# Patient Record
Sex: MALE | Race: WHITE | Employment: FULL TIME | URBAN - METROPOLITAN AREA
[De-identification: names, ages, dates, MRNs, and addresses within clinical notes are randomized per-mention and may not be internally consistent; named-entity substitution may affect disease eponyms.]

---

## 2021-12-22 ENCOUNTER — OFFICE VISIT (OUTPATIENT)
Dept: ENDOCRINOLOGY | Age: 51
End: 2021-12-22
Payer: COMMERCIAL

## 2021-12-22 VITALS
WEIGHT: 307 LBS | BODY MASS INDEX: 35.52 KG/M2 | HEART RATE: 86 BPM | SYSTOLIC BLOOD PRESSURE: 116 MMHG | HEIGHT: 78 IN | DIASTOLIC BLOOD PRESSURE: 82 MMHG

## 2021-12-22 DIAGNOSIS — E11.9 TYPE 2 DIABETES MELLITUS WITHOUT COMPLICATION, WITHOUT LONG-TERM CURRENT USE OF INSULIN (HCC): Primary | ICD-10-CM

## 2021-12-22 DIAGNOSIS — E55.9 VITAMIN D DEFICIENCY: ICD-10-CM

## 2021-12-22 LAB — HBA1C MFR BLD: 8.3 %

## 2021-12-22 PROCEDURE — 3052F HG A1C>EQUAL 8.0%<EQUAL 9.0%: CPT | Performed by: INTERNAL MEDICINE

## 2021-12-22 PROCEDURE — 83036 HEMOGLOBIN GLYCOSYLATED A1C: CPT | Performed by: INTERNAL MEDICINE

## 2021-12-22 PROCEDURE — 99204 OFFICE O/P NEW MOD 45 MIN: CPT | Performed by: INTERNAL MEDICINE

## 2021-12-22 RX ORDER — VALSARTAN AND HYDROCHLOROTHIAZIDE 320; 25 MG/1; MG/1
TABLET, FILM COATED ORAL
COMMUNITY

## 2021-12-22 RX ORDER — GLIMEPIRIDE 4 MG/1
TABLET ORAL
COMMUNITY
End: 2022-05-19 | Stop reason: SDUPTHER

## 2021-12-22 RX ORDER — DULAGLUTIDE 0.75 MG/.5ML
INJECTION, SOLUTION SUBCUTANEOUS
COMMUNITY
Start: 2021-11-28 | End: 2021-12-22

## 2021-12-22 RX ORDER — DULAGLUTIDE 1.5 MG/.5ML
INJECTION, SOLUTION SUBCUTANEOUS
Qty: 4 PEN | Refills: 5 | Status: SHIPPED
Start: 2021-12-22 | End: 2022-01-24 | Stop reason: CLARIF

## 2021-12-22 RX ORDER — TAMSULOSIN HYDROCHLORIDE 0.4 MG/1
CAPSULE ORAL
COMMUNITY

## 2021-12-22 RX ORDER — OMEPRAZOLE 20 MG/1
CAPSULE, DELAYED RELEASE ORAL
COMMUNITY
Start: 2021-12-16

## 2021-12-22 NOTE — PATIENT INSTRUCTIONS
Recommendations for today's visit  · continue Glimepiride 4 mg twice a day   · Change Trulicity to 1.5 mg weekly   · Check Blood sugar 2 times/day before meals and at bedtime and send us sugar log in a week    These are your blood sugar, blood pressure, cholesterol and A1c goals:  · Blood sugar fastin mg/dl to 130 mg/dl  · Blood sugar before meals: <150 mg/dl  · Peak blood sugar lower than 180 mg/dl  · A1c: between 6.5 - 7.5%    I you have any questions please call Dr. Ajith Mitchell office     Hardik Walker MD  Endocrinologist, [facility])   [address] [place] [address]   Phone: [phone]  Fax: [phone]  Email: [email or web address]

## 2021-12-22 NOTE — PROGRESS NOTES
[address] [place] [address] Department of Endocrinology Diabetes and Metabolism   [address] [facility] [address]   Phone: [phone]  Fax: [phone]    Date of Service: 12/22/2021  Primary Care Physician: Shoaib Jo MD  Provider: Roscoe Chavez MD    Reason for the visit:  DM type 2     History of Present Illness: The history is provided by the patient. No  was used. Accuracy of the patient data is excellent. Felicia Cabot is a very pleasant 46 y.o. male seen today for diabetes management     Felicia Cabot was diagnosed with diabetes in 10/2019 and currently on Trulicity 5.35 mg/wk, Glimepirde 4 mg BID   The patient has been checking blood sugar daily and readings above goal   Most recent A1c results summarized below  Lab Results   Component Value Date    LABA1C 8.3 12/22/2021     Patient has had no hypoglycemic episodes  The patient hasn't been mindful of what has been eating and wasn't following diabetes diet    I reviewed current medications and the patient has no issues with diabetes medications  Felicia Cabot is up to date with eye exam and denied any history of diabetic retinopathy   The patient performs  own feet care  Microvascular complications:  No Retinopathy, Nephropathy or Neuropathy   Macrovascular complications: no CAD   The patient receives Flushot every year      PAST MEDICAL HISTORY   No past medical history on file. PAST SURGICAL HISTORY   No past surgical history on file. SOCIAL HISTORY   Tobacco:   reports that he has never smoked. He has never used smokeless tobacco.  Alcohol:   reports no history of alcohol use. Drugs:   reports no history of drug use. FAMILY HISTORY   No family history on file.     ALLERGIES AND DRUG REACTIONS   No Known Allergies    CURRENT MEDICATIONS   Current Outpatient Medications   Medication Sig Dispense Refill    Dulaglutide (TRULICITY) 1.5 RW/3.1MF SOPN Inject 1.5 mg Subcutaneous weekly 4 pen 5    glimepiride (AMARYL) 4 MG tablet glimepiride 4 mg tablet      influenza quadrivalent-split vaccine (FLULAVAL QUADRIVALENT) SUSP injection Flulaval Quad [id number] 60 mcg (15 mcg x 4)/0.5 mL intramuscular susp.  omeprazole (PRILOSEC) 20 MG delayed release capsule TAKE ONE CAPSULE BY MOUTH EVERY DAY      tamsulosin (FLOMAX) 0.4 MG capsule tamsulosin 0.4 mg capsule      valsartan-hydroCHLOROthiazide (DIOVAN-HCT) 320-25 MG per tablet valsartan 320 mg-hydrochlorothiazide 25 mg tablet       No current facility-administered medications for this visit. Review of Systems  Constitutional: No fever, no chills, no diaphoresis, no generalized weakness. HEENT: No blurred vision, No sore throat, no ear pain, no hair loss  Neck: denied any neck swelling, difficulty swallowing,   Cardio-pulmonary: No CP, SOB or palpitation, No orthopnea or PND. No cough or wheezing. GI: No N/V/D, no constipation, No abdominal pain, no melena or hematochezia   : Denied any dysuria, hematuria, flank pain, discharge, or incontinence. Skin: denied any rash, ulcer, Hirsute, or hyperpigmentation. MSK: denied any joint deformity, joint pain/swelling, muscle pain, or back pain. Neuro: no numbness, no tingling, no weakness, _    OBJECTIVE    /82   Pulse 86   Ht 6' 8\" (2.032 m)   Wt (!) 307 lb (139.3 kg)   BMI 33.73 kg/m²   BP Readings from Last 4 Encounters:   12/22/21 116/82     Wt Readings from Last 6 Encounters:   12/22/21 (!) 307 lb (139.3 kg)       Physical examination:  General: awake alert, oriented x3, no abnormal position or movements. HEENT: normocephalic non-traumatic, no exophthalmos   Neck: supple, no LN enlargement, no thyromegaly, no thyroid tenderness, no JVD. Pulm: Clear equal air entry no added sounds, no wheezing or rhonchi    CVS: S1 + S2, no murmur, no heave. Dorsalis pedis pulse palpable   Abd: soft lax, no tenderness, no organomegaly, audible bowel sounds. Skin: warm, no lesions, no rash.  No callus, no Ulcers, No acanthosis nigricans  Musculoskeletal: No back tenderness, no kyphosis/scoliosis    Neuro: CN intact, Monofilament sensation decreased bilateral , muscle power normal  Psych: normal mood, and affect    Review of Laboratory Data:  I personally reviewed the following lab:  No results found for: WBC, RBC, HGB, HCT, MCV, MCH, MCHC, RDW, PLT, MPV, GRANULOCYTES, BANDS   No results found for: NA, K, CO2, BUN, CREATININE, CALCIUM, LABGLOM, GFRAA   No results found for: TSH, T4FREE, F6NVMWK, FT3, G3AWYPN, TSI, TPOABS, THGAB  Lab Results   Component Value Date    LABA1C 8.3 12/22/2021     Lab Results   Component Value Date    LABA1C 8.3 12/22/2021     No results found for: TRIG, HDL, LDLCALC, CHOL  No results found for: VITD25    ASSESSMENT & RECOMMENDATIONS   Savanna Puga, a 46 y.o.-old male seen in for the following issues     Diabetes Mellitus Type     · Patient's diabetes is uncontrol   · Will change DM regimen to Trulicity 1.5 mg/wk, Glimepirde 4 mg BID   · The patient was advised to check blood sugars 4 times a day before meals and at bedtime and send BS readings to our office in a week. · Discussed with patient A1c and blood sugar goals   · Patient will need routine diabetes maintenance and prevention  · The patient was referred to diabetic educator for further teaching   · Diabetes labs before next visit     Dietary noncompliance   Discussed with patient the importance of eating consistent carbohydrate meals, avoiding high glycemic index food. Also, discussed with patient the risk and negative consequences of dietary noncompliance on blood glucose control, blood pressure and weight    Obesity   Discussed lifestyle changes including diet and exercise with patient in depth.  Also discussed with patient cardiovascular risk associated with obesity    vitD deficiency   · Continue vitD supplement     I personally reviewed external notes from PCP and other patient's care team providers, and personally interpreted labs associated with the above diagnosis. I also ordered labs to further assess and manage the above addressed medical conditions. Return in about 4 months (around 4/22/2022) for DM type 2. The above issues were reviewed with the patient who understood and agreed with the plan. Thank you for allowing us to participate in the care of this patient. Please do not hesitate to contact us with any additional questions. Diagnosis Orders   1. Type 2 diabetes mellitus without complication, without long-term current use of insulin ([facility])  POCT glycosylated hemoglobin (Hb A1C)    Dulaglutide (TRULICITY) 1.5 OZ/7.0JT SOPN    Hemoglobin A1C    Lipid Panel    Comprehensive Metabolic Panel    Microalbumin / Creatinine Urine Ratio   2. Vitamin D deficiency  Vitamin D 25 Hydroxy    Comprehensive Metabolic Panel     Billie Arizmendi MD  Endocrinologist, [facility] Diabetes Care and Endocrinology   [address] [place] [id number]   Phone: [phone]  Fax: [phone]  --------------------------------------------  An electronic signature was used to authenticate this note.  Fredi Vidal MD on 12/22/2021 at 1:21 PM

## 2022-01-24 RX ORDER — DULAGLUTIDE 3 MG/.5ML
3 INJECTION, SOLUTION SUBCUTANEOUS WEEKLY
Qty: 4 PEN | Refills: 3 | Status: SHIPPED
Start: 2022-01-24 | End: 2022-05-19

## 2022-02-11 ENCOUNTER — TELEPHONE (OUTPATIENT)
Dept: ENDOCRINOLOGY | Age: 52
End: 2022-02-11

## 2022-02-11 DIAGNOSIS — E11.9 TYPE 2 DIABETES MELLITUS WITHOUT COMPLICATION, WITHOUT LONG-TERM CURRENT USE OF INSULIN (HCC): Primary | ICD-10-CM

## 2022-02-15 ENCOUNTER — HOSPITAL ENCOUNTER (OUTPATIENT)
Dept: DIABETES SERVICES | Age: 52
Setting detail: THERAPIES SERIES
Discharge: HOME | End: 2022-02-15
Payer: COMMERCIAL

## 2022-02-15 PROCEDURE — G0109 DIAB MANAGE TRN IND/GROUP: HCPCS

## 2022-02-15 SDOH — ECONOMIC STABILITY: FOOD INSECURITY: ADDITIONAL INFORMATION: NO

## 2022-02-15 ASSESSMENT — PROBLEM AREAS IN DIABETES QUESTIONNAIRE (PAID)
WORRYING ABOUT THE FUTURE AND THE POSSIBILITY OF SERIOUS COMPLICATIONS: 2
FEELING SCARED WHEN YOU THINK ABOUT LIVING WITH DIABETES: 2
FEELING THAT DIABETES IS TAKING UP TOO MUCH OF YOUR MENTAL AND PHYSICAL ENERGY EVERY DAY: 0
PAID-5 TOTAL SCORE: 4
COPING WITH COMPLICATIONS OF DIABETES: 0
FEELING DEPRESSED WHEN YOU THINK ABOUT LIVING WITH DIABETES: 0

## 2022-02-15 NOTE — LETTER
[facility])- Diabetes Education Department Initial Diabetes Education Letter    2022       Re:     Rosy Cox         :  1970  Dear Dr. Klaudia Mc:                    Thank you for referring your patient, Rosy Cox, for diabetes education. Rosy Cox has completed their comprehensive education plan today which included the topics below:    Nursing/Medical [x]      Nutrition [x]  [] Diabetes disease process & treatment   [] Diabetes medication use and safety   [] Self-monitoring blood glucose/interpreting results  [] Prevention, detection and treatment of acute complications  [] Prevention, detection and treatment of chronic complications  [] Developing strategies to address psychosocial issues    [] Nutritional management: basic principles   [] Carbohydrate counting, plate method, label reading  [] Benefits of/ways to incorporate physical activity  [] Problem solving and preparing for crisis situations  [] Diabetes supply management  [] Goal setting and behavior modification         PAID -5 (Problem Areas in Diabetes) Survey Results  4  A total score of 8 or greater indicates possible diabetes related emotional distress which warrants further assessment.  [] [address] and Crisis Resource information provided. Comments:     PATIENT SELECTED GOAL:   []  I will follow my meal plan and measure my carbohydrate foods. []  I will increase my activity to:  []  I will check my blood glucose as ordered by my doctor. []  I will take my medications at the correct times as ordered by my doctor.   []  Other:      DIABETES SELF-MANAGEMENT SUPPORT PLAN/REFERRALS (patient identified):  [x] Keep my scheduled visits with my doctor   [] Make and keep appointments with specialists (foot, eye, dentist) as recommended  [] Consult my pharmacist with all new medications and/or any medication questions  [] Get tested for sleep apnea  [] Seek help for:   [] Make an appointment with:   [] Attend smoking cessation classes or call help-line (4-599-QUIT-NOW; [phone])  [] Attend a diabetes support group  [] Use diabetes magazines, books, or the American Diabetes Association website (www.diabetes. org) for more information    [x] Start or increase exercising at home or join a program:  [] Other: There will be a follow-up in 3 months to evaluate the attainment of their chosen goal, and self identified support plan and you will be notified of their progress. Thank you for referring this patient to our program.  Please do not hesitate to call if you have any questions at [phone] [facility] or [facility]) or [phone] ([address] [place] [address]).         Sincerely,    Selene Galeas, RN, BSN, [address] [facility] Diabetes Education Department  American Diabetes Association Recognized DSMES Program

## 2022-02-16 ENCOUNTER — HOSPITAL ENCOUNTER (OUTPATIENT)
Dept: DIABETES SERVICES | Age: 52
Setting detail: THERAPIES SERIES
Discharge: HOME | End: 2022-02-16
Payer: COMMERCIAL

## 2022-02-16 PROCEDURE — G0109 DIAB MANAGE TRN IND/GROUP: HCPCS

## 2022-02-16 NOTE — LETTER
[facility])- Diabetes Education Department Initial Diabetes Education Letter    2022       Re:     Reji Euceda         :  1970  Dear Dr. Reynoso Manner:                    Thank you for referring your patient, Reji Euceda, for diabetes education. Reji Euceda has completed their comprehensive education plan today which included the topics below:    Nursing/Medical [x]      Nutrition [x]  [] Diabetes disease process & treatment   [] Diabetes medication use and safety   [] Self-monitoring blood glucose/interpreting results  [] Prevention, detection and treatment of acute complications  [] Prevention, detection and treatment of chronic complications  [] Developing strategies to address psychosocial issues    [] Nutritional management: basic principles   [] Carbohydrate counting, plate method, label reading  [] Benefits of/ways to incorporate physical activity  [] Problem solving and preparing for crisis situations  [] Diabetes supply management  [] Goal setting and behavior modification         PAID -5 (Problem Areas in Diabetes) Survey Results  4  A total score of 8 or greater indicates possible diabetes related emotional distress which warrants further assessment.  [] [address]  and Crisis Resource information provided. Comments:     PATIENT SELECTED GOAL:   [x]  I will follow my meal plan and measure my carbohydrate foods. []  I will increase my activity to:  []  I will check my blood glucose as ordered by my doctor. []  I will take my medications at the correct times as ordered by my doctor.   []  Other:      DIABETES SELF-MANAGEMENT SUPPORT PLAN/REFERRALS (patient identified):  [x] Keep my scheduled visits with my doctor   [] Make and keep appointments with specialists (foot, eye, dentist) as recommended  [] Consult my pharmacist with all new medications and/or any medication questions  [] Get tested for sleep apnea  [] Seek help for:   [] Make an appointment with:   [] Attend smoking cessation classes or call help-line (5-431-QUIT-NOW; [phone])  [] Attend a diabetes support group  [] Use diabetes magazines, books, or the American Diabetes Association website (www.diabetes. org) for more information    [x] Start or increase exercising at home or join a program:  [] Other: There will be a follow-up in 3 months to evaluate the attainment of their chosen goal, and self identified support plan and you will be notified of their progress. Thank you for referring this patient to our program.  Please do not hesitate to call if you have any questions at [phone] [facility] or [facility]) or [phone] ([facility]).         Sincerely,    Fanta Taylor, RN, BSN, Bharath Trivedi RD, LD    [facility]) Diabetes Education Department  American Diabetes Association Recognized DSMES Program

## 2022-02-16 NOTE — PROGRESS NOTES
effects of diabetes medicines on blood glucose levels;  -List diabetes medication taken, action & side effects 1 [x] All     []  []  4 2/15/22 KMS  Glimepiride 4 mg BID  Trulicity 3 mg weekly    Insulin/Injectables/glucagon  -Name appropriate injection sites; proper storage; supplies needed;  1   [x]  4     Demonstrate proper technique  []      Monitoring blood glucose, interpreting and using results:   -Identify the purpose of testing   -Identify recommended & personal blood glucose targets & HgbA1C target levels  -State the Importance of logging blood glucose levels for pattern recognition;   -State benefits of reading/using pt generated health data  -Verbalize safe lancet disposal 1 [x] All     []  []    []  []  []  4 2/15/22 KMS  Monitors daily    -Demonstrate proper testing technique  []      Incorporating physical activity into lifestyle:   -State effect of exercise on blood glucose levels;   -State benefits of regular exercise;   -Define safety considerations/food choices if needed.  -Describe contraindications/maintenance of activity. 1 [x] All     []  []    []  []  4 2/15/22 KMS  Mowing/yard work, golfing    Incorporating nutritional management into lifestyle:   -Describe effect of type, amount & timing of food on blood glucose  -Describe methods for preparing and planning healthy meals  -Correctly read food labels  -Name 3 foods high in Carbohydrate 1 [] All       []    []    []  []      -Plan a carbohydrate-controlled meal based on individualized meal plan  -Demonstrate CHO counting/portion control   []  []      Developing strategies for problem solving to promote health/change behavior. -Identify 7 self-care behaviors; Personal health risk factors; Benefits, challenges & strategies for behavioral change and set an individualized goal selection.  1       []     []Nutrition  []Monitoring  []Exercise  []Medication  []Other     Identified Barriers to learning/adherence to self management plan:    None  [] other    Instruction Method:  Lecture/Discussion, Power Point Presentation  and Handouts    Supporting Education Materials/Equipment Provided: Self-management manual and Nutritional Packet   []Cook Islander materials       [] services     []Other:      Encounter Type Date Attended Start Time End Time Comments No Show Dates   Assessment          Session 1 2/15/22 KMS 8920 2000      Session 2        1:1 DSMES          In person Follow-up         Gestational Diabetes         Individual MNT        Meter Instrx        Insulin Instrx           Additional Comments: [x] Pt attended group sessions. PCP notified via EMR.          Date:   Follow-up goal attainment based on patients initial DSMES goal    Dr Notified by [] EMR []Fax        []Post class Hgb A1C  []Medication compliance   []Plate method/meal plan compliance   []Able to state the number of Carbohydrate servings eaten at B,L,D   []Testing blood glucose as prescribed by PCP   []Exercise Routine   []Other:   []Other:     []Patient lost to follow-up  Dr Notified by []EMR []Fax     Personal Support Plan:      [x] Keep all scheduled doctor appointments   [] Make and keep appointments with specialists (foot, eye, dentist) as recommended   [] Consult my pharmacist about all new medications or to ask any medication questions   [] Get tested for sleep apnea   [] Seek help for:   [] Make an appointment with:   [] Attend smoking cessation classes or call 1-800-QUIT-NOW  [] Attend Diabetes Support Group   [] Use diabetes magazines, books, or credible web-sites like the ADA for more information  [x] Increase exercise at home or join an exercise program:   [] Other:

## 2022-02-17 NOTE — PROGRESS NOTES
Diabetes Self-Management Education Record    Participant Name: Elinor Heard  Referring Provider: Irineo Krabbe, MD  Assessment/Evaluation Ratings:  1=Needs Instruction   4=Demonstrates Understanding/Competency  2=Needs Review   NC=Not Covered    3=Comprehends Key Points  N/A=Not Applicable  Topics/Learning Objectives Pre-session Assess Date:  Instructor initials/date  2/15/22 KMS Instr. Date    Instructor initials/date  2/15/22 KMS  2/17/22 CS Follow-up Post- session Eval Comments   Diabetes disease process & Treatment process:   -Define type of diabetes in simple terms.  - Describe the ABCs of  diabetes management  -Identify own type of diabetes  -Identify lifestyle changes/treatment options  -other:  1 [x] All     []  []  []  []  []  4 2/15/22 KMS  Type 2 DM   A1C 8.3%    Developing strategies for Healthy coping/psychosocial issues:    -Describe feelings about living with diabetes  -Identify coping strategies and sources of stress  -Identify support needed & support network available  -Complete PAID-5 Diabetes questionnaire 1 [x] All     []  []  []    []  4   2/15/22 KMS  PAID-5 Score: 4   Prevention, detection & treatment of Chronic complications:    -Identify the prevention, detection and treatment for complications including immunizations, preventive eye, foot, dental and renal exams as indicated per the participant's duration of diabetes and health status.  -Define the natural course of diabetes and the relationship of blood glucose levels to long term complications of diabetes.   1 [x] All     []            []  4    Prevention, detection & treatment of acute complications:    -State the causes,signs & symptoms of hyper & hypoglycemia, and prevention & treatment strategies.   -Describe sick day guidelines  DKA /indications for ketone testing &  when to call physician  1 [x] All     []      []    4          -Identify severe weather/situation crisis  & diabetes supplies management  []      Using medications safely:   -State effects of diabetes medicines on blood glucose levels;  -List diabetes medication taken, action & side effects 1 [x] All     []  []  4 2/15/22 KMS  Glimepiride 4 mg BID  Trulicity 3 mg weekly    Insulin/Injectables/glucagon  -Name appropriate injection sites; proper storage; supplies needed;  1   [x]  4     Demonstrate proper technique  []      Monitoring blood glucose, interpreting and using results:   -Identify the purpose of testing   -Identify recommended & personal blood glucose targets & HgbA1C target levels  -State the Importance of logging blood glucose levels for pattern recognition;   -State benefits of reading/using pt generated health data  -Verbalize safe lancet disposal 1 [x] All     []  []    []  []  []  4 2/15/22 KMS  Monitors daily    -Demonstrate proper testing technique  []      Incorporating physical activity into lifestyle:   -State effect of exercise on blood glucose levels;   -State benefits of regular exercise;   -Define safety considerations/food choices if needed.  -Describe contraindications/maintenance of activity. 1 [x] All     []  []    []  []  4 2/15/22 KMS  Mowing/yard work, golfing    Incorporating nutritional management into lifestyle:   -Describe effect of type, amount & timing of food on blood glucose  -Describe methods for preparing and planning healthy meals  -Correctly read food labels  -Name 3 foods high in Carbohydrate 1 [x] All       []    []    []  []  4 2/16/22 CS  Pt attended Session 2. Participated in group activities on Diabetes Plate Method and healthy food choices. Demonstrated understanding of carb counting using food lists with carbohydrate serving sizes. Read CHO content of food correctly on nutrition facts using various food labels . Questions answered.  Followed along and took notes.   -Plan a carbohydrate-controlled meal based on individualized meal plan  -Demonstrate CHO counting/portion control   []  []      Developing strategies for problem solving to promote health/change behavior. -Identify 7 self-care behaviors; Personal health risk factors; Benefits, challenges & strategies for behavioral change and set an individualized goal selection. 1       [x]  4 2/16/22 CS  [x]Nutrition  []Monitoring  []Exercise  []Medication  []Other     Identified Barriers to learning/adherence to self management plan:    None  []  other    Instruction Method:  Lecture/Discussion, Power Point Presentation  and Handouts    Supporting Education Materials/Equipment Provided: Self-management manual and Nutritional Packet   []Bengali materials       [] services     []Other:      Encounter Type Date Attended Start Time End Time Comments No Show Dates   Assessment          Session 1 2/15/22 KMS 1730 2000      Session 2 2/16/ 22CS 1730 2000     1:1 DSMES          In person Follow-up         Gestational Diabetes         Individual MNT        Meter Instrx        Insulin Instrx           Additional Comments: [x] Pt attended group sessions. PCP notified via EMR.          Date:   Follow-up goal attainment based on patients initial DSMES goal    Dr Notified by [] EMR []Fax        []Post class Hgb A1C  []Medication compliance   []Plate method/meal plan compliance   []Able to state the number of Carbohydrate servings eaten at B,L,D   []Testing blood glucose as prescribed by PCP   []Exercise Routine   []Other:   []Other:     []Patient lost to follow-up  Dr Notified by []EMR []Fax     Personal Support Plan:      [x] Keep all scheduled doctor appointments   [] Make and keep appointments with specialists (foot, eye, dentist) as recommended   [] Consult my pharmacist about all new medications or to ask any medication questions   [] Get tested for sleep apnea   [] Seek help for:   [] Make an appointment with:   [] Attend smoking cessation classes or call 8-800-QUIT-NOW  [] Attend Diabetes Support Group   [] Use diabetes magazines, books, or credible web-sites like the ADA for more information  [x] Increase exercise at home or join an exercise program:   [] Other:

## 2022-03-10 ENCOUNTER — HOSPITAL ENCOUNTER (EMERGENCY)
Age: 52
Discharge: HOME | End: 2022-03-10
Payer: COMMERCIAL

## 2022-03-10 VITALS
RESPIRATION RATE: 18 BRPM | BODY MASS INDEX: 34.13 KG/M2 | HEART RATE: 92 BPM | OXYGEN SATURATION: 97 % | DIASTOLIC BLOOD PRESSURE: 83 MMHG | SYSTOLIC BLOOD PRESSURE: 121 MMHG | WEIGHT: 295 LBS | TEMPERATURE: 97.5 F | HEIGHT: 78 IN

## 2022-03-10 DIAGNOSIS — N30.01 ACUTE CYSTITIS WITH HEMATURIA: Primary | ICD-10-CM

## 2022-03-10 LAB
BACTERIA: ABNORMAL /HPF
BASOPHILS ABSOLUTE: 0.1 E9/L (ref 0–0.2)
BASOPHILS RELATIVE PERCENT: 0.8 % (ref 0–2)
BILIRUBIN URINE: ABNORMAL
BLOOD, URINE: ABNORMAL
CLARITY: ABNORMAL
CO2: 30 MMOL/L (ref 22–29)
COLOR: YELLOW
EOSINOPHILS ABSOLUTE: 0.33 E9/L (ref 0.05–0.5)
EOSINOPHILS RELATIVE PERCENT: 2.6 % (ref 0–6)
EPITHELIAL CELLS, UA: ABNORMAL /HPF
GFR AFRICAN AMERICAN: >60
GFR NON-AFRICAN AMERICAN: >60 ML/MIN/1.73
GLUCOSE BLD-MCNC: 216 MG/DL (ref 74–99)
GLUCOSE URINE: >=1000 MG/DL
HCT VFR BLD CALC: 43.6 % (ref 37–54)
HEMOGLOBIN: 14.8 G/DL (ref 12.5–16.5)
IMMATURE GRANULOCYTES #: 0.05 E9/L
IMMATURE GRANULOCYTES %: 0.4 % (ref 0–5)
KETONES, URINE: NEGATIVE MG/DL
LEUKOCYTE ESTERASE, URINE: NEGATIVE
LYMPHOCYTES ABSOLUTE: 2.92 E9/L (ref 1.5–4)
LYMPHOCYTES RELATIVE PERCENT: 23.1 % (ref 20–42)
MCH RBC QN AUTO: 29 PG (ref 26–35)
MCHC RBC AUTO-ENTMCNC: 33.9 % (ref 32–34.5)
MCV RBC AUTO: 85.3 FL (ref 80–99.9)
MONOCYTES ABSOLUTE: 0.93 E9/L (ref 0.1–0.95)
MONOCYTES RELATIVE PERCENT: 7.4 % (ref 2–12)
MUCUS: PRESENT /LPF
NEUTROPHILS ABSOLUTE: 8.3 E9/L (ref 1.8–7.3)
NEUTROPHILS RELATIVE PERCENT: 65.7 % (ref 43–80)
NITRITE, URINE: NEGATIVE
PDW BLD-RTO: 12.6 FL (ref 11.5–15)
PERFORMED ON: ABNORMAL
PH UA: 5.5 (ref 5–9)
PLATELET # BLD: 378 E9/L (ref 130–450)
PMV BLD AUTO: 10 FL (ref 7–12)
POC ANION GAP: 9 MMOL/L (ref 7–16)
POC BUN: 18 MG/DL (ref 8–23)
POC CHLORIDE: 102 MMOL/L (ref 100–108)
POC CREATININE: 1 MG/DL (ref 0.7–1.2)
POC POTASSIUM: 3.8 MMOL/L (ref 3.5–5)
POC SODIUM: 141 MMOL/L (ref 132–146)
PROTEIN UA: 30 MG/DL
RBC # BLD: 5.11 E12/L (ref 3.8–5.8)
RBC UA: >20 /HPF (ref 0–2)
SPECIFIC GRAVITY UA: >=1.03 (ref 1–1.03)
UROBILINOGEN, URINE: 0.2 E.U./DL
WBC # BLD: 12.6 E9/L (ref 4.5–11.5)
WBC UA: ABNORMAL /HPF (ref 0–5)

## 2022-03-10 PROCEDURE — 85025 COMPLETE CBC W/AUTO DIFF WBC: CPT

## 2022-03-10 PROCEDURE — 81001 URINALYSIS AUTO W/SCOPE: CPT

## 2022-03-10 PROCEDURE — 36415 COLL VENOUS BLD VENIPUNCTURE: CPT

## 2022-03-10 PROCEDURE — 99211 OFF/OP EST MAY X REQ PHY/QHP: CPT

## 2022-03-10 PROCEDURE — 87088 URINE BACTERIA CULTURE: CPT

## 2022-03-10 PROCEDURE — 80051 ELECTROLYTE PANEL: CPT

## 2022-03-10 PROCEDURE — 82947 ASSAY GLUCOSE BLOOD QUANT: CPT

## 2022-03-10 PROCEDURE — 84520 ASSAY OF UREA NITROGEN: CPT

## 2022-03-10 PROCEDURE — 82565 ASSAY OF CREATININE: CPT

## 2022-03-10 RX ORDER — CEFDINIR 300 MG/1
300 CAPSULE ORAL 2 TIMES DAILY
Qty: 14 CAPSULE | Refills: 0 | Status: SHIPPED | OUTPATIENT
Start: 2022-03-10 | End: 2022-03-17

## 2022-03-10 ASSESSMENT — PAIN DESCRIPTION - LOCATION: LOCATION: FLANK

## 2022-03-10 ASSESSMENT — PAIN DESCRIPTION - PAIN TYPE: TYPE: ACUTE PAIN

## 2022-03-10 ASSESSMENT — PAIN DESCRIPTION - DESCRIPTORS: DESCRIPTORS: SORE

## 2022-03-10 ASSESSMENT — PAIN - FUNCTIONAL ASSESSMENT: PAIN_FUNCTIONAL_ASSESSMENT: 0-10

## 2022-03-10 ASSESSMENT — PAIN DESCRIPTION - ONSET: ONSET: GRADUAL

## 2022-03-10 ASSESSMENT — PAIN DESCRIPTION - ORIENTATION: ORIENTATION: RIGHT

## 2022-03-10 ASSESSMENT — PAIN DESCRIPTION - PROGRESSION: CLINICAL_PROGRESSION: GRADUALLY IMPROVING

## 2022-03-10 ASSESSMENT — PAIN DESCRIPTION - FREQUENCY: FREQUENCY: INTERMITTENT

## 2022-03-10 ASSESSMENT — PAIN SCALES - GENERAL: PAINLEVEL_OUTOF10: 2

## 2022-03-10 NOTE — ED PROVIDER NOTES
[facility]  Department of Emergency Medicine  UC Encounter Note  3/10/22   4:56 PM EST      NAME: Dominga Camarena  :  1970  MRN:  [id number]    Chief Complaint: Flank Pain (right side) and Dysuria (Urinating in small amounts. States urine is dark.)      This is a 66-year-old male that presents to urgent care stating that he has been having some dark urine lately and he has been urinating in small amounts but having some dysuria as well. He does state a history of kidney stones in the past and does state that he does passed kidney stones from time to time. He says about 5 days ago he started passing a kidney stone on the right side and felt higher up right flank pain and that pain has dissipated and has a twinge of discomfort to the right side of his abdomen from time to time. He just wants to make sure he does have an infection. He does take Flomax on a regular basis. He denies any fevers or chills. No chest pain or shortness of breath. On first contact patient he appears to be in no acute distress. Patient denies swelling in his groin area or rash. Review of Systems  Pertinent positives and negatives are stated within HPI, all other systems reviewed and are negative. Physical Exam  Vitals and nursing note reviewed. Constitutional:       Appearance: He is well-developed. HENT:      Head: Normocephalic and atraumatic. Jaw: No trismus. Right Ear: Hearing, tympanic membrane, ear canal and external ear normal.      Left Ear: Hearing, tympanic membrane, ear canal and external ear normal.      Nose: Nose normal.      Right Sinus: No maxillary sinus tenderness or frontal sinus tenderness. Left Sinus: No maxillary sinus tenderness or frontal sinus tenderness. Mouth/Throat:      Pharynx: Uvula midline. No uvula swelling.    Eyes:      General: Lids are normal.      Conjunctiva/sclera: Conjunctivae normal.      Pupils: Pupils are equal, round, and reactive to light. Cardiovascular:      Rate and Rhythm: Normal rate and regular rhythm. Heart sounds: Normal heart sounds. No murmur heard. Pulmonary:      Effort: Pulmonary effort is normal.      Breath sounds: Normal breath sounds. Abdominal:      General: Bowel sounds are normal.      Palpations: Abdomen is soft. Abdomen is not rigid. Tenderness: There is no abdominal tenderness. There is no right CVA tenderness, left CVA tenderness, guarding or rebound. Musculoskeletal:      Cervical back: Normal range of motion and neck supple. Skin:     General: Skin is warm and dry. Findings: No abrasion or rash. Neurological:      General: No focal deficit present. Mental Status: He is alert and oriented to person, place, and time. GCS: GCS eye subscore is 4. GCS verbal subscore is 5. GCS motor subscore is 6. Cranial Nerves: No cranial nerve deficit. Sensory: No sensory deficit. Coordination: Coordination normal.      Gait: Gait normal.         Procedures    MDM  Number of Diagnoses or Management Options  Acute cystitis with hematuria  Diagnosis management comments: No acute distress  Abdomen benign  Labs reviewed  meds discussed  Instructions given.            --------------------------------------------- PAST HISTORY ---------------------------------------------  Past Medical History:  has a past medical history of Acid reflux, Diabetes mellitus ([facility] [address] Hypertension, and Kidney stone. Past Surgical History:  has a past surgical history that includes Lithotripsy. Social History:  reports that he has never smoked. He has never used smokeless tobacco. He reports that he does not drink alcohol and does not use drugs. Family History: family history is not on file. The patients home medications have been reviewed. Allergies: Patient has no known allergies.     -------------------------------------------------- RESULTS -------------------------------------------------  Results for orders placed or performed during the hospital encounter of 03/10/22   Urinalysis   Result Value Ref Range    Color, UA Yellow Straw/Yellow    Clarity, UA CLOUDY (A) Clear    Glucose, Ur >=1000 (A) Negative mg/dL    Bilirubin Urine SMALL (A) Negative    Ketones, Urine Negative Negative mg/dL    Specific Gravity, UA >=1.030 1.005 - 1.030    Blood, Urine LARGE (A) Negative    pH, UA 5.5 5.0 - 9.0    Protein, UA 30 (A) Negative mg/dL    Urobilinogen, Urine 0.2 <2.0 E.U./dL    Nitrite, Urine Negative Negative    Leukocyte Esterase, Urine Negative Negative   CBC with Auto Differential   Result Value Ref Range    WBC 12.6 (H) 4.5 - 11.5 E9/L    RBC 5.11 3.80 - 5.80 E12/L    Hemoglobin 14.8 12.5 - 16.5 g/dL    Hematocrit 43.6 37.0 - 54.0 %    MCV 85.3 80.0 - 99.9 fL    MCH 29.0 26.0 - 35.0 pg    MCHC 33.9 32.0 - 34.5 %    RDW 12.6 11.5 - 15.0 fL    Platelets 538 401 - 622 E9/L    MPV 10.0 7.0 - 12.0 fL    Neutrophils % 65.7 43.0 - 80.0 %    Immature Granulocytes % 0.4 0.0 - 5.0 %    Lymphocytes % 23.1 20.0 - 42.0 %    Monocytes % 7.4 2.0 - 12.0 %    Eosinophils % 2.6 0.0 - 6.0 %    Basophils % 0.8 0.0 - 2.0 %    Neutrophils Absolute 8.30 (H) 1.80 - 7.30 E9/L    Immature Granulocytes # 0.05 E9/L    Lymphocytes Absolute 2.92 1.50 - 4.00 E9/L    Monocytes Absolute 0.93 0.10 - 0.95 E9/L    Eosinophils Absolute 0.33 0.05 - 0.50 E9/L    Basophils Absolute 0.10 0.00 - 0.20 E9/L   Microscopic Urinalysis   Result Value Ref Range    Mucus, UA Present (A) None Seen /LPF    WBC, UA 2-5 0 - 5 /HPF    RBC, UA >20 0 - 2 /HPF    Epithelial Cells, UA RARE /HPF    Bacteria, UA RARE (A) None Seen /HPF   POCT Venous   Result Value Ref Range    POC Sodium 141 132 - 146 mmol/L    POC Potassium 3.8 3.5 - 5.0 mmol/L    POC Chloride 102 100 - 108 mmol/L    CO2 30 (H) 22 - 29 mmol/L    POC Anion Gap 9 7 - 16 mmol/L    POC Glucose 216 (H) 74 - 99 mg/dl    POC BUN 18 8 - 23 mg/dL    POC Creatinine 1.0 0.7 - 1.2 mg/dL    GFR Non-African American >60 >=60 mL/min/1.73    GFR  >60     Performed on SEE BELOW      No orders to display       ------------------------- NURSING NOTES AND VITALS REVIEWED ---------------------------   The nursing notes within the ED encounter and vital signs as below have been reviewed. /83   Pulse 92   Temp 97.5 °F (36.4 °C) (Infrared)   Resp 18   Ht 6' 8\" (2.032 m)   Wt 295 lb (133.8 kg)   SpO2 97%   BMI 32.41 kg/m²   Oxygen Saturation Interpretation: Normal      ------------------------------------------ PROGRESS NOTES ------------------------------------------   I have spoken with the patient and discussed todays results, in addition to providing specific details for the plan of care and counseling regarding the diagnosis and prognosis. Their questions are answered at this time and they are agreeable with the plan.      --------------------------------- ADDITIONAL PROVIDER NOTES ---------------------------------     This patient is stable for discharge. I have shared the specific conditions for return, as well as the importance of follow-up. * NOTE: This report was transcribed using voice recognition software. Every effort was made to ensure accuracy; however, inadvertent computerized transcription errors may be present.    --------------------------------- IMPRESSION AND DISPOSITION ---------------------------------    IMPRESSION  1.  Acute cystitis with hematuria        DISPOSITION  Disposition: Discharge to home  Patient condition is good       Martha Rojas PA-C  03/10/22 [id number]

## 2022-03-13 LAB — URINE CULTURE, ROUTINE: NORMAL

## 2022-05-12 LAB
ALBUMIN SERPL-MCNC: 4.5 G/DL
ALP BLD-CCNC: NORMAL U/L
ALT SERPL-CCNC: NORMAL U/L
ANION GAP SERPL CALCULATED.3IONS-SCNC: NORMAL MMOL/L
AST SERPL-CCNC: NORMAL U/L
AVERAGE GLUCOSE: NORMAL
BILIRUB SERPL-MCNC: NORMAL MG/DL
BUN BLDV-MCNC: NORMAL MG/DL
CALCIUM SERPL-MCNC: 10.3 MG/DL
CHLORIDE BLD-SCNC: 101 MMOL/L
CHOLESTEROL, TOTAL: 199 MG/DL
CHOLESTEROL/HDL RATIO: NORMAL
CO2: NORMAL
CREAT SERPL-MCNC: 1.01 MG/DL
CREATININE, URINE: 13.8
GFR CALCULATED: 77.6
GLUCOSE BLD-MCNC: NORMAL MG/DL
HBA1C MFR BLD: 7.9 %
HDLC SERPL-MCNC: 46 MG/DL (ref 35–70)
LDL CHOLESTEROL CALCULATED: 131 MG/DL (ref 0–160)
MICROALBUMIN/CREAT 24H UR: 168.1 MG/G{CREAT}
MICROALBUMIN/CREAT UR-RTO: 8.2
NONHDLC SERPL-MCNC: NORMAL MG/DL
POTASSIUM SERPL-SCNC: 4.2 MMOL/L
SODIUM BLD-SCNC: 141 MMOL/L
TOTAL PROTEIN: NORMAL
TRIGL SERPL-MCNC: 109 MG/DL
VITAMIN D 25-HYDROXY: 20
VITAMIN D2, 25 HYDROXY: NORMAL
VITAMIN D3,25 HYDROXY: NORMAL
VLDLC SERPL CALC-MCNC: NORMAL MG/DL

## 2022-05-12 NOTE — PROGRESS NOTES
Diabetes Self-Management Education Record    Participant Name: Ariana Otero  Referring Provider: Leah Monahan MD  Assessment/Evaluation Ratings:  1=Needs Instruction   4=Demonstrates Understanding/Competency  2=Needs Review   NC=Not Covered    3=Comprehends Key Points  N/A=Not Applicable  Topics/Learning Objectives Pre-session Assess Date:  Instructor initials/date  2/15/22 KMS Instr. Date    Instructor initials/date  2/15/22 KMS  2/17/22 CS Follow-up Post- session Eval Comments   Diabetes disease process & Treatment process:   -Define type of diabetes in simple terms.  - Describe the ABCs of  diabetes management  -Identify own type of diabetes  -Identify lifestyle changes/treatment options  -other:  1 [x] All     []  []  []  []  []  4 2/15/22 KMS  Type 2 DM   A1C 8.3%    Developing strategies for Healthy coping/psychosocial issues:    -Describe feelings about living with diabetes  -Identify coping strategies and sources of stress  -Identify support needed & support network available  -Complete PAID-5 Diabetes questionnaire 1 [x] All     []  []  []    []  4   2/15/22 KMS  PAID-5 Score: 4   Prevention, detection & treatment of Chronic complications:    -Identify the prevention, detection and treatment for complications including immunizations, preventive eye, foot, dental and renal exams as indicated per the participant's duration of diabetes and health status.  -Define the natural course of diabetes and the relationship of blood glucose levels to long term complications of diabetes.   1 [x] All     []            []  4    Prevention, detection & treatment of acute complications:    -State the causes,signs & symptoms of hyper & hypoglycemia, and prevention & treatment strategies.   -Describe sick day guidelines  DKA /indications for ketone testing &  when to call physician  1 [x] All     []      []    4          -Identify severe weather/situation crisis  & diabetes supplies management  []      Using medications safely:   -State effects of diabetes medicines on blood glucose levels;  -List diabetes medication taken, action & side effects 1 [x] All     []  []  4 2/15/22 KMS  Glimepiride 4 mg BID  Trulicity 3 mg weekly    Insulin/Injectables/glucagon  -Name appropriate injection sites; proper storage; supplies needed;  1   [x]  4     Demonstrate proper technique  []      Monitoring blood glucose, interpreting and using results:   -Identify the purpose of testing   -Identify recommended & personal blood glucose targets & HgbA1C target levels  -State the Importance of logging blood glucose levels for pattern recognition;   -State benefits of reading/using pt generated health data  -Verbalize safe lancet disposal 1 [x] All     []  []    []  []  []  4 2/15/22 KMS  Monitors daily    -Demonstrate proper testing technique  []      Incorporating physical activity into lifestyle:   -State effect of exercise on blood glucose levels;   -State benefits of regular exercise;   -Define safety considerations/food choices if needed.  -Describe contraindications/maintenance of activity. 1 [x] All     []  []    []  []  4 2/15/22 KMS  Mowing/yard work, golfing    Incorporating nutritional management into lifestyle:   -Describe effect of type, amount & timing of food on blood glucose  -Describe methods for preparing and planning healthy meals  -Correctly read food labels  -Name 3 foods high in Carbohydrate 1 [x] All       []    []    []  []  4 2/16/22 CS  Pt attended Session 2. Participated in group activities on Diabetes Plate Method and healthy food choices. Demonstrated understanding of carb counting using food lists with carbohydrate serving sizes. Read CHO content of food correctly on nutrition facts using various food labels . Questions answered.  Followed along and took notes.   -Plan a carbohydrate-controlled meal based on individualized meal plan  -Demonstrate CHO counting/portion control   []  []      Developing strategies for problem solving to promote health/change behavior. -Identify 7 self-care behaviors; Personal health risk factors; Benefits, challenges & strategies for behavioral change and set an individualized goal selection. 1       [x]  4 2/16/22 CS  [x]Nutrition  []Monitoring  []Exercise  []Medication  []Other     Identified Barriers to learning/adherence to self management plan:    None  []  other    Instruction Method:  Lecture/Discussion, Power Point Presentation  and Handouts    Supporting Education Materials/Equipment Provided: Self-management manual and Nutritional Packet   []Greek materials       [] services     []Other:      Encounter Type Date Attended Start Time End Time Comments No Show Dates   Assessment          Session 1 2/15/22 KMS 1730 2000      Session 2 2/16/ 22CS 1730 2000     1:1 DSMES          In person Follow-up         Gestational Diabetes         Individual MNT        Meter Instrx        Insulin Instrx           Additional Comments: [x] Pt attended group sessions. PCP notified via EMR.          Date: 5/12/22 PC  Follow-up goal attainment based on patients initial DSMES goals  met  Dr Notified by [] EMR []Fax        []Post class Hgb A1C  []Medication compliance   [x]Plate method/meal plan compliance   []Able to state the number of Carbohydrate servings eaten at B,L,D   [x]Testing blood glucose as prescribed by PCP   []Exercise Routine   []Other:   []Other:     []Patient lost to follow-up   Notified by []EMR []Fax     Personal Support Plan:      [x] Keep all scheduled doctor appointments   [] Make and keep appointments with specialists (foot, eye, dentist) as recommended   [] Consult my pharmacist about all new medications or to ask any medication questions   [] Get tested for sleep apnea   [] Seek help for:   [] Make an appointment with:   [] Attend smoking cessation classes or call 1800-QUIT-NOW  [] Attend Diabetes Support Group   [] Use diabetes magazines, books, or credible web-sites like the ADA for more information  [x] Increase exercise at home or join an exercise program:   [] Other:

## 2022-05-12 NOTE — LETTER
[facility] Diabetes Education Follow-up letter to Doctor     Name: Juani Chowdary  :   1970    Date:   2022                   Dear Dr. Jake Schroeder: Thank you for referring Juani Chowdary to [facility] Education Services. Juani Chowdary has completed her Diabetes Education Self-Management Sessions. We follow-up with our participants after 3 months to monitor their progress on their selected goal.    PATIENT'S GOAL/EDUCATIONAL OUTCOMES:    Juani Chowdary selected a behavioral goal of: healthy eating   Juani Chowdary outcome 3 months post education was met 75% of the time but wishes to come back to review his meal plan again.     LEARNING OUTCOME: YOUR PATIENT WAS FOUND TO BE COMPETENT IN THE FOLLOWING TOPICS:    [x] Diabetes disease process & treatment   [] Healthy Eating  [x] Being Active  [x] Taking Medication  [x] Monitoring Glucose  [x] Acute and Chronic Complications  [x] Lifestyle and Healthy coping  [x] Diabetes Distress and Support      Please contact us if you need any further assistance with this patient at:     [facility] or [facility] [address] Locations: [place] Location: [address] [place], [place] [address]gayatri Espinosa RN [phone] [facility] Diabetes Education Department  American Diabetes Association Recognized DSMES Program

## 2022-05-16 DIAGNOSIS — E11.9 TYPE 2 DIABETES MELLITUS WITHOUT COMPLICATION, WITHOUT LONG-TERM CURRENT USE OF INSULIN (HCC): ICD-10-CM

## 2022-05-16 DIAGNOSIS — E55.9 VITAMIN D DEFICIENCY: ICD-10-CM

## 2022-05-19 ENCOUNTER — OFFICE VISIT (OUTPATIENT)
Dept: ENDOCRINOLOGY | Age: 52
End: 2022-05-19
Payer: COMMERCIAL

## 2022-05-19 VITALS
HEIGHT: 78 IN | BODY MASS INDEX: 34.48 KG/M2 | WEIGHT: 298 LBS | OXYGEN SATURATION: 96 % | HEART RATE: 105 BPM | DIASTOLIC BLOOD PRESSURE: 88 MMHG | SYSTOLIC BLOOD PRESSURE: 122 MMHG

## 2022-05-19 DIAGNOSIS — E78.5 DYSLIPIDEMIA ASSOCIATED WITH TYPE 2 DIABETES MELLITUS (HCC): ICD-10-CM

## 2022-05-19 DIAGNOSIS — E66.09 CLASS 1 OBESITY DUE TO EXCESS CALORIES WITHOUT SERIOUS COMORBIDITY WITH BODY MASS INDEX (BMI) OF 32.0 TO 32.9 IN ADULT: ICD-10-CM

## 2022-05-19 DIAGNOSIS — E11.9 TYPE 2 DIABETES MELLITUS WITHOUT COMPLICATION, WITHOUT LONG-TERM CURRENT USE OF INSULIN (HCC): Primary | ICD-10-CM

## 2022-05-19 DIAGNOSIS — E11.69 DYSLIPIDEMIA ASSOCIATED WITH TYPE 2 DIABETES MELLITUS (HCC): ICD-10-CM

## 2022-05-19 DIAGNOSIS — Z91.119 DIETARY NONCOMPLIANCE: ICD-10-CM

## 2022-05-19 DIAGNOSIS — E55.9 VITAMIN D DEFICIENCY: ICD-10-CM

## 2022-05-19 PROCEDURE — 3051F HG A1C>EQUAL 7.0%<8.0%: CPT | Performed by: CLINICAL NURSE SPECIALIST

## 2022-05-19 PROCEDURE — 99214 OFFICE O/P EST MOD 30 MIN: CPT | Performed by: CLINICAL NURSE SPECIALIST

## 2022-05-19 RX ORDER — GLIMEPIRIDE 4 MG/1
TABLET ORAL
Qty: 180 TABLET | Refills: 1 | Status: SHIPPED | OUTPATIENT
Start: 2022-05-19

## 2022-05-19 RX ORDER — ATORVASTATIN CALCIUM 20 MG/1
20 TABLET, FILM COATED ORAL DAILY
Qty: 90 TABLET | Refills: 1 | Status: SHIPPED | OUTPATIENT
Start: 2022-05-19

## 2022-05-25 ENCOUNTER — PATIENT MESSAGE (OUTPATIENT)
Dept: ENDOCRINOLOGY | Age: 52
End: 2022-05-25

## 2022-05-25 ENCOUNTER — TELEPHONE (OUTPATIENT)
Dept: ENDOCRINOLOGY | Age: 52
End: 2022-05-25

## 2022-05-31 ENCOUNTER — HOSPITAL ENCOUNTER (OUTPATIENT)
Dept: DIABETES SERVICES | Age: 52
Setting detail: THERAPIES SERIES
Discharge: HOME | End: 2022-05-31

## 2022-06-07 ENCOUNTER — TELEPHONE (OUTPATIENT)
Dept: ENDOCRINOLOGY | Age: 52
End: 2022-06-07

## 2022-06-09 ENCOUNTER — HOSPITAL ENCOUNTER (OUTPATIENT)
Dept: DIABETES SERVICES | Age: 52
Setting detail: THERAPIES SERIES
Discharge: HOME | End: 2022-06-09
Payer: COMMERCIAL

## 2022-06-09 PROCEDURE — G0108 DIAB MANAGE TRN  PER INDIV: HCPCS

## 2022-06-09 NOTE — PROGRESS NOTES
Diabetes Self-Management Education Record    Participant Name: Juani Chowdary  Referring Provider: Nader Glover MD  Assessment/Evaluation Ratings:  1=Needs Instruction   4=Demonstrates Understanding/Competency  2=Needs Review   NC=Not Covered    3=Comprehends Key Points  N/A=Not Applicable  Topics/Learning Objectives Pre-session Assess Date:  Instructor initials/date  2/15/22 KMS Instr. Date    Instructor initials/date  2/15/22 KMS  2/17/22 CS Follow-up Post- session Eval Comments   Diabetes disease process & Treatment process:   -Define type of diabetes in simple terms.  - Describe the ABCs of  diabetes management  -Identify own type of diabetes  -Identify lifestyle changes/treatment options  -other:  1 [x] All     []  []  []  []  []  4 2/15/22 KMS  Type 2 DM   A1C 8.3%    Developing strategies for Healthy coping/psychosocial issues:    -Describe feelings about living with diabetes  -Identify coping strategies and sources of stress  -Identify support needed & support network available  -Complete PAID-5 Diabetes questionnaire 1 [x] All     []  []  []    []  4   2/15/22 KMS  PAID-5 Score: 4   Prevention, detection & treatment of Chronic complications:    -Identify the prevention, detection and treatment for complications including immunizations, preventive eye, foot, dental and renal exams as indicated per the participant's duration of diabetes and health status.  -Define the natural course of diabetes and the relationship of blood glucose levels to long term complications of diabetes.   1 [x] All     []            []  4    Prevention, detection & treatment of acute complications:    -State the causes,signs & symptoms of hyper & hypoglycemia, and prevention & treatment strategies.   -Describe sick day guidelines  DKA /indications for ketone testing &  when to call physician  1 [x] All     []      []    4          -Identify severe weather/situation crisis  & diabetes supplies management  []      Using medications safely:   -State effects of diabetes medicines on blood glucose levels;  -List diabetes medication taken, action & side effects 1 [x] All     []  []  4 2/15/22 KMS  Glimepiride 4 mg BID  Trulicity 3 mg weekly    Insulin/Injectables/glucagon  -Name appropriate injection sites; proper storage; supplies needed;  1   [x]  4     Demonstrate proper technique  []      Monitoring blood glucose, interpreting and using results:   -Identify the purpose of testing   -Identify recommended & personal blood glucose targets & HgbA1C target levels  -State the Importance of logging blood glucose levels for pattern recognition;   -State benefits of reading/using pt generated health data  -Verbalize safe lancet disposal 1 [x] All     []  []    []  []  []  4 2/15/22 KMS  Monitors daily    -Demonstrate proper testing technique  []      Incorporating physical activity into lifestyle:   -State effect of exercise on blood glucose levels;   -State benefits of regular exercise;   -Define safety considerations/food choices if needed.  -Describe contraindications/maintenance of activity. 1 [x] All     []  []    []  []  4 2/15/22 KMS  Mowing/yard work, golfing    Incorporating nutritional management into lifestyle:   -Describe effect of type, amount & timing of food on blood glucose  -Describe methods for preparing and planning healthy meals  -Correctly read food labels  -Name 3 foods high in Carbohydrate 1 [x] All       []    []    []  []  4 2/16/22 CS  Pt attended Session 2. Participated in group activities on Diabetes Plate Method and healthy food choices. Demonstrated understanding of carb counting using food lists with carbohydrate serving sizes. Read CHO content of food correctly on nutrition facts using various food labels . Questions answered. Followed along and took notes. 6/9/22 SE  DSMES 3 Meal planning. Pt instructed on 2200 Calories, 3 meals 3  Snack(s). Pt was able to plan meal using food models and serving size sheet.  Reviewed fast food and convenience food modifications. Attended with wife. Encouraged to decrease intake of diet soda from the current 2-3 liters a day. Encouraged planning ahead/packing meals and snacks. Purchased extra food lists book.    -Plan a carbohydrate-controlled meal based on individualized meal plan  -Demonstrate CHO counting/portion control   []  []      Developing strategies for problem solving to promote health/change behavior. -Identify 7 self-care behaviors; Personal health risk factors; Benefits, challenges & strategies for behavioral change and set an individualized goal selection. 1       [x]  4 2/16/22 CS  [x]Nutrition  []Monitoring  []Exercise  []Medication  []Other     Identified Barriers to learning/adherence to self management plan:    None  []  other    Instruction Method:  Lecture/Discussion, Power Point Presentation  and Handouts    Supporting Education Materials/Equipment Provided: Self-management manual and Nutritional Packet   []Mohawk materials       [] services     []Other:      Encounter Type Date Attended Start Time End Time Comments No Show Dates   Assessment          Session 1 2/15/22 KMS 1730 2000      Session 2 2/16/ 22CS 1730 2000     1:1 DSMES  6/9/22 SE [id number]      In person Follow-up         Gestational Diabetes         Individual MNT        Meter Instrx        Insulin Instrx           Additional Comments: [x] Pt attended group sessions. PCP notified via EMR. Attended with wife. 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     Date:   Follow-up goal attainment based on patients initial DSMES goal     Notified by [] EMR []Fax        []Post class Hgb A1C  []Medication compliance   []Plate method/meal plan compliance   []Able to state the number of Carbohydrate servings eaten at B,L,D   []Testing blood glucose as prescribed by PCP   []Exercise Routine   []Other:   []Other:     []Patient lost to follow-up   Notified by []EMR []Fax     Personal Support Plan:      [x] Keep all scheduled doctor appointments   [] Make and keep appointments with specialists (foot, eye, dentist) as recommended   [] Consult my pharmacist about all new medications or to ask any medication questions   [] Get tested for sleep apnea   [] Seek help for:   [] Make an appointment with:   [] Attend smoking cessation classes or call 1-800-QUIT-NOW  [] Attend Diabetes Support Group   [] Use diabetes magazines, books, or credible web-sites like the ADA for more information  [x] Increase exercise at home or join an exercise program:   [] Other:

## 2022-09-13 ENCOUNTER — OFFICE VISIT (OUTPATIENT)
Dept: ENDOCRINOLOGY | Age: 52
End: 2022-09-13
Payer: COMMERCIAL

## 2022-09-13 VITALS
HEIGHT: 78 IN | SYSTOLIC BLOOD PRESSURE: 122 MMHG | HEART RATE: 76 BPM | WEIGHT: 292 LBS | BODY MASS INDEX: 33.78 KG/M2 | DIASTOLIC BLOOD PRESSURE: 77 MMHG | OXYGEN SATURATION: 95 %

## 2022-09-13 DIAGNOSIS — Z91.119 DIETARY NONCOMPLIANCE: ICD-10-CM

## 2022-09-13 DIAGNOSIS — E11.69 DYSLIPIDEMIA ASSOCIATED WITH TYPE 2 DIABETES MELLITUS (HCC): ICD-10-CM

## 2022-09-13 DIAGNOSIS — E78.5 DYSLIPIDEMIA ASSOCIATED WITH TYPE 2 DIABETES MELLITUS (HCC): ICD-10-CM

## 2022-09-13 DIAGNOSIS — E11.9 TYPE 2 DIABETES MELLITUS WITHOUT COMPLICATION, WITHOUT LONG-TERM CURRENT USE OF INSULIN (HCC): Primary | ICD-10-CM

## 2022-09-13 DIAGNOSIS — E55.9 VITAMIN D DEFICIENCY: ICD-10-CM

## 2022-09-13 DIAGNOSIS — E66.09 CLASS 1 OBESITY DUE TO EXCESS CALORIES WITHOUT SERIOUS COMORBIDITY WITH BODY MASS INDEX (BMI) OF 32.0 TO 32.9 IN ADULT: ICD-10-CM

## 2022-09-13 LAB — HBA1C MFR BLD: 9.4 %

## 2022-09-13 PROCEDURE — 99214 OFFICE O/P EST MOD 30 MIN: CPT | Performed by: CLINICAL NURSE SPECIALIST

## 2022-09-13 PROCEDURE — 83036 HEMOGLOBIN GLYCOSYLATED A1C: CPT | Performed by: CLINICAL NURSE SPECIALIST

## 2022-09-13 PROCEDURE — 3046F HEMOGLOBIN A1C LEVEL >9.0%: CPT | Performed by: CLINICAL NURSE SPECIALIST

## 2022-09-13 RX ORDER — INSULIN GLARGINE 100 [IU]/ML
20 INJECTION, SOLUTION SUBCUTANEOUS NIGHTLY
Qty: 5 ADJUSTABLE DOSE PRE-FILLED PEN SYRINGE | Refills: 5 | Status: SHIPPED | OUTPATIENT
Start: 2022-09-13

## 2022-09-13 RX ORDER — PEN NEEDLE, DIABETIC 30 GX3/16"
NEEDLE, DISPOSABLE MISCELLANEOUS
Qty: 100 EACH | Refills: 1 | Status: SHIPPED | OUTPATIENT
Start: 2022-09-13

## 2022-09-13 NOTE — PROGRESS NOTES
[address] [place] [address] Department of Endocrinology Diabetes and Metabolism   [address] [facility] [address]   Phone: [phone]  Fax: [phone]    Date of Service: 9/13/2022    Primary Care Physician: Milena Galindo MD  Referring physician: No ref. provider found  Provider: JASEN Bernal - CNS     Reason for the visit:  Type 2 DM       History of Present Illness: The history is provided by the patient. No  was used. Accuracy of the patient data is excellent.   Netta Ramos is a very pleasant 46 y.o. male seen today for diabetes management     Netta Ramos was diagnosed with diabetes in 10/2019 and currently on Glimepiride 4 mg BID and Jardiance 25 mg daily   Trulciity stopped d/t hx of gastroparesis   Patient attempted metformin in the past however this caused severe GI upset   The patient has been checking blood sugar daily      Checking BG daily   BG usually 140-160  Most recent A1c results summarized below  Lab Results   Component Value Date/Time    LABA1C 9.4 09/13/2022 01:53 PM    LABA1C 7.9 05/12/2022 12:00 AM    LABA1C 8.3 12/22/2021 01:20 PM       Patient has had no hypoglycemic episodes  The patient hasn't been mindful of what has been eating and wasn't following diabetes diet    I reviewed current medications and the patient has no issues with diabetes medications  Netta Rmaos is up to date with eye exam and denied any history of diabetic retinopathy   The patient performs  own feet care  Microvascular complications:  No Retinopathy, Nephropathy or Neuropathy   Macrovascular complications: no CAD   The patient receives Flushot every year    No HX of pancreatitis  No Hx of MTC  + HX of gastroparesis         PAST MEDICAL HISTORY   Past Medical History:   Diagnosis Date    Acid reflux     Diabetes mellitus ([address]     Hypertension     Kidney stone        PAST SURGICAL HISTORY   Past Surgical History:   Procedure Laterality Date    LITHOTRIPSY SOCIAL HISTORY   Tobacco:   reports that he has never smoked. He has never used smokeless tobacco.  Alcohol:   reports no history of alcohol use. Drugs:   reports no history of drug use. FAMILY HISTORY   No family history on file. ALLERGIES AND DRUG REACTIONS   No Known Allergies    CURRENT MEDICATIONS   Current Outpatient Medications   Medication Sig Dispense Refill    insulin glargine (LANTUS SOLOSTAR) 100 UNIT/ML injection pen Inject 20 Units into the skin nightly 5 Adjustable Dose Pre-filled Pen Syringe 5    Insulin Pen Needle (PEN NEEDLES) 32G X 4 MM MISC Once per day 100 each 1    empagliflozin (JARDIANCE) 25 MG tablet Take 1 tablet by mouth daily 90 tablet 1    glimepiride (AMARYL) 4 MG tablet glimepiride 4 mg 1 tablet  tablet 1    atorvastatin (LIPITOR) 20 MG tablet Take 1 tablet by mouth daily 90 tablet 1    Naproxen Sodium (ALEVE PO) Take by mouth      blood glucose test strips (ASCENSIA AUTODISC VI;ONE TOUCH ULTRA TEST VI) strip Accu-Chek Guide test strips use to test blood sugars 2x daily 100 each 6    influenza quadrivalent-split vaccine (FLULAVAL QUADRIVALENT) SUSP injection Flulaval Quad 7374-9884 60 mcg (15 mcg x 4)/0.5 mL intramuscular susp. omeprazole (PRILOSEC) 20 MG delayed release capsule TAKE ONE CAPSULE BY MOUTH EVERY DAY      tamsulosin (FLOMAX) 0.4 MG capsule tamsulosin 0.4 mg capsule      valsartan-hydroCHLOROthiazide (DIOVAN-HCT) 320-25 MG per tablet valsartan 320 mg-hydrochlorothiazide 25 mg tablet       No current facility-administered medications for this visit. Review of Systems  Constitutional: No fever, no chills, no diaphoresis, no generalized weakness. HEENT: No blurred vision, No sore throat, no ear pain, no hair loss  Neck: denied any neck swelling, difficulty swallowing,   Cardio-pulmonary: No CP, SOB or palpitation, No orthopnea or PND. No cough or wheezing.   GI: No N/V/D, no constipation, No abdominal pain, no melena or hematochezia   : Denied any dysuria, hematuria, flank pain, discharge, or incontinence. Skin: denied any rash, ulcer, Hirsute, or hyperpigmentation. MSK: denied any joint deformity, joint pain/swelling, muscle pain, or back pain. Neuro: no numbness, no tingling, no weakness, _    OBJECTIVE    /77   Pulse 76   Ht 6' 8\" (2.032 m)   Wt 292 lb (132.5 kg)   SpO2 95%   BMI 32.08 kg/m²   BP Readings from Last 4 Encounters:   09/13/22 122/77   05/19/22 122/88   03/10/22 121/83   12/22/21 116/82     Wt Readings from Last 6 Encounters:   09/13/22 292 lb (132.5 kg)   05/19/22 298 lb (135.2 kg)   03/10/22 295 lb (133.8 kg)   12/22/21 (!) 307 lb (139.3 kg)       Physical examination:  General: awake alert, oriented x3, no abnormal position or movements. HEENT: normocephalic non-traumatic, no exophthalmos   Neck: supple, no LN enlargement, no thyromegaly, no thyroid tenderness, no JVD. Pulm: Clear equal air entry no added sounds, no wheezing or rhonchi    CVS: S1 + S2, no murmur, no heave. Dorsalis pedis pulse palpable   Abd: soft lax, no tenderness, no organomegaly, audible bowel sounds. Skin: warm, no lesions, no rash.  No callus, no Ulcers, No acanthosis nigricans  Musculoskeletal: No back tenderness, no kyphosis/scoliosis    Neuro: CN intact, Monofilament sensation normal bilateral , muscle power normal  Psych: normal mood, and affect      Review of Laboratory Data:  I personally reviewed the following lab:  Lab Results   Component Value Date/Time    WBC 12.6 (H) 03/10/2022 05:14 PM    RBC 5.11 03/10/2022 05:14 PM    HGB 14.8 03/10/2022 05:14 PM    HCT 43.6 03/10/2022 05:14 PM    MCV 85.3 03/10/2022 05:14 PM    MCH 29.0 03/10/2022 05:14 PM    MCHC 33.9 03/10/2022 05:14 PM    RDW 12.6 03/10/2022 05:14 PM     03/10/2022 05:14 PM    MPV 10.0 03/10/2022 05:14 PM      Lab Results   Component Value Date/Time     05/12/2022 12:00 AM    K 4.2 05/12/2022 12:00 AM    CO2 30 (H) 03/10/2022 05:39 PM    CREATININE 1.01 05/12/2022 12:00 AM    CALCIUM 10.3 05/12/2022 12:00 AM    LABGLOM 77.6 05/12/2022 12:00 AM    LABGLOM >60 03/10/2022 05:39 PM    GFRAA >60 03/10/2022 05:39 PM      No results found for: TSH, T4FREE, M4VTECW, FT3, I4CGVOS, TSI, TPOABS, THGAB  Lab Results   Component Value Date/Time    LABA1C 9.4 09/13/2022 01:53 PM    MALBCR 8.2 05/12/2022 12:00 AM    LABCREA 13.8 05/12/2022 12:00 AM     Lab Results   Component Value Date/Time    LABA1C 9.4 09/13/2022 01:53 PM    LABA1C 7.9 05/12/2022 12:00 AM    LABA1C 8.3 12/22/2021 01:20 PM     Lab Results   Component Value Date/Time    TRIG 109 05/12/2022 12:00 AM    HDL 46 05/12/2022 12:00 AM    LDLCALC 131 05/12/2022 12:00 AM    CHOL 199 05/12/2022 12:00 AM     Lab Results   Component Value Date/Time    VITD25 20.0 05/12/2022 12:00 AM       ASSESSMENT & RECOMMENDATIONS   Savanna Puga, a 46 y.o.-old male seen in for the following issues       Assessment:      Diagnosis Orders   1. Type 2 diabetes mellitus without complication, without long-term current use of insulin ([facility])  POCT glycosylated hemoglobin (Hb A1C)          Plan:     1. Type 2 diabetes mellitus without complication, without long-term current use of insulin ([facility]   Patient diabetes shows  worsening control. Hemoglobin A1c 9.4%   Continue Jardiance 25 mg 1 tablet daily  Continue glimepiride 4 mg 1 tablet twice daily  Start Lantus pen 20 units at night   Counseled on insulin pen use  Advised to keep hydrated drink plenty of water  Check blood sugars BID   Send BG log in 1 week  Hemoglobin A1c goal 6.5 to 7%  Encouraged diet and exercise   2. Vitamin D deficiency   Continue vitamin D 1000 IU daily  Counseled on the importance of vitamin D and bone health   3. Dietary noncompliance   Discussed with patient the importance of eating consistent carbohydrate meals, avoiding high glycemic index food.  Also, discussed with patient the risk and negative consequences of dietary noncompliance on blood glucose control, blood pressure and weight  Patient will meet with dietitian   4. Class 1 obesity due to excess calories without serious comorbidity with body mass index (BMI) of 32.0 to 32.9 in adult   Discussed lifestyle changes including diet and exercise with patient in depth. Also discussed with patient cardiovascular risk associated with obesity   5. Dyslipidemia  Continue atorvastatin 20 mg at night  Will reassess lipids at next [address] [place] and exercise      I personally spent > 30 minutes reviewing  external notes from PCP and other patient's care team providers, and personally interpreted labs associated with the above diagnosis. I also ordered labs to further assess and manage the above addressed medical conditions. Return in about 3 months (around 12/13/2022). The above issues were reviewed with the patient who understood and agreed with the plan. Thank you for allowing us to participate in the care of this patient. Please do not hesitate to contact us with any additional questions. JASEN Sargent     [facility] - BEHAVIORAL HEALTH SERVICES Diabetes Care and Endocrinology   [address] [facility] [address]   Phone: [phone]  Fax: [phone]  --------------------------------------------  An electronic signature was used to authenticate this note.  JASEN Sargent on 9/13/2022 at 2:19 PM

## 2022-09-28 ENCOUNTER — TELEPHONE (OUTPATIENT)
Dept: ENDOCRINOLOGY | Age: 52
End: 2022-09-28

## 2022-12-13 ENCOUNTER — OFFICE VISIT (OUTPATIENT)
Dept: ENDOCRINOLOGY | Age: 52
End: 2022-12-13
Payer: COMMERCIAL

## 2022-12-13 VITALS
DIASTOLIC BLOOD PRESSURE: 77 MMHG | HEART RATE: 73 BPM | SYSTOLIC BLOOD PRESSURE: 112 MMHG | WEIGHT: 294 LBS | BODY MASS INDEX: 34.02 KG/M2 | HEIGHT: 78 IN | OXYGEN SATURATION: 93 %

## 2022-12-13 DIAGNOSIS — E11.69 DYSLIPIDEMIA ASSOCIATED WITH TYPE 2 DIABETES MELLITUS (HCC): ICD-10-CM

## 2022-12-13 DIAGNOSIS — E66.09 CLASS 1 OBESITY DUE TO EXCESS CALORIES WITHOUT SERIOUS COMORBIDITY WITH BODY MASS INDEX (BMI) OF 32.0 TO 32.9 IN ADULT: ICD-10-CM

## 2022-12-13 DIAGNOSIS — E78.5 DYSLIPIDEMIA ASSOCIATED WITH TYPE 2 DIABETES MELLITUS (HCC): ICD-10-CM

## 2022-12-13 DIAGNOSIS — E11.9 TYPE 2 DIABETES MELLITUS WITHOUT COMPLICATION, WITHOUT LONG-TERM CURRENT USE OF INSULIN (HCC): Primary | ICD-10-CM

## 2022-12-13 DIAGNOSIS — Z91.119 DIETARY NONCOMPLIANCE: ICD-10-CM

## 2022-12-13 DIAGNOSIS — E55.9 VITAMIN D DEFICIENCY: ICD-10-CM

## 2022-12-13 LAB — HBA1C MFR BLD: 8 %

## 2022-12-13 PROCEDURE — 3052F HG A1C>EQUAL 8.0%<EQUAL 9.0%: CPT | Performed by: CLINICAL NURSE SPECIALIST

## 2022-12-13 PROCEDURE — 99214 OFFICE O/P EST MOD 30 MIN: CPT | Performed by: CLINICAL NURSE SPECIALIST

## 2022-12-13 PROCEDURE — 83036 HEMOGLOBIN GLYCOSYLATED A1C: CPT | Performed by: CLINICAL NURSE SPECIALIST

## 2022-12-13 NOTE — PROGRESS NOTES
[address] [place] [address] Department of Endocrinology Diabetes and Metabolism   [address] [facility] [address]   Phone: [phone]  Fax: [phone]    Date of Service: 12/13/2022    Primary Care Physician: Nitesh Ellis MD  Referring physician: No ref. provider found  Provider: JASEN Connors - CNS     Reason for the visit:  Type 2 DM       History of Present Illness: The history is provided by the patient. No  was used. Accuracy of the patient data is excellent.   Jesus Palacio is a very pleasant 46 y.o. male seen today for diabetes management     Jesus Palacio was diagnosed with diabetes in 10/2019 and currently on Glimepiride 4 mg BID, Lantus 22 units at night  Stopped Jardiance d/t polyuria   Trulciity stopped d/t hx of gastroparesis   Patient attempted metformin in the past however this caused severe GI upset   The patient has been checking blood sugar daily        Checking BG daily   Average 30 day 144   Most recent A1c results summarized below  Lab Results   Component Value Date/Time    LABA1C 8.0 12/13/2022 02:21 PM    LABA1C 9.4 09/13/2022 01:53 PM    LABA1C 7.9 05/12/2022 12:00 AM       Patient has had no hypoglycemic episodes  The patient hasn't been mindful of what has been eating and wasn't following diabetes diet    I reviewed current medications and the patient has no issues with diabetes medications  Jesus Palacio is up to date with eye exam and denied any history of diabetic retinopathy   The patient performs  own feet care  Microvascular complications:  No Retinopathy, Nephropathy or Neuropathy   Macrovascular complications: no CAD   The patient receives Flushot every year    No HX of pancreatitis  No Hx of MTC  + HX of gastroparesis         PAST MEDICAL HISTORY   Past Medical History:   Diagnosis Date    Acid reflux     Diabetes mellitus ([address]     Hypertension     Kidney stone        PAST SURGICAL HISTORY   Past Surgical History:   Procedure Laterality Date    LITHOTRIPSY         SOCIAL HISTORY   Tobacco:   reports that he has never smoked. He has never used smokeless tobacco.  Alcohol:   reports no history of alcohol use. Drugs:   reports no history of drug use. FAMILY HISTORY   No family history on file. ALLERGIES AND DRUG REACTIONS   No Known Allergies    CURRENT MEDICATIONS   Current Outpatient Medications   Medication Sig Dispense Refill    empagliflozin (JARDIANCE) 10 MG tablet Take 1 tablet by mouth daily 90 tablet 3    insulin glargine (LANTUS SOLOSTAR) 100 UNIT/ML injection pen Inject 20 Units into the skin nightly (Patient taking differently: Inject 22 Units into the skin nightly) 5 Adjustable Dose Pre-filled Pen Syringe 5    glimepiride (AMARYL) 4 MG tablet glimepiride 4 mg 1 tablet  tablet 1    Insulin Pen Needle (PEN NEEDLES) 32G X 4 MM MISC Once per day 100 each 1    atorvastatin (LIPITOR) 20 MG tablet Take 1 tablet by mouth daily 90 tablet 1    Naproxen Sodium (ALEVE PO) Take by mouth      blood glucose test strips (ASCENSIA AUTODISC VI;ONE TOUCH ULTRA TEST VI) strip Accu-Chek Guide test strips use to test blood sugars 2x daily 100 each 6    influenza quadrivalent-split vaccine (FLULAVAL QUADRIVALENT) SUSP injection Flulaval Quad 1341-0376 60 mcg (15 mcg x 4)/0.5 mL intramuscular susp. omeprazole (PRILOSEC) 20 MG delayed release capsule TAKE ONE CAPSULE BY MOUTH EVERY DAY      tamsulosin (FLOMAX) 0.4 MG capsule tamsulosin 0.4 mg capsule      valsartan-hydroCHLOROthiazide (DIOVAN-HCT) 320-25 MG per tablet valsartan 320 mg-hydrochlorothiazide 25 mg tablet       No current facility-administered medications for this visit. Review of Systems  Constitutional: No fever, no chills, no diaphoresis, no generalized weakness. HEENT: No blurred vision, No sore throat, no ear pain, no hair loss  Neck: denied any neck swelling, difficulty swallowing,   Cardio-pulmonary: No CP, SOB or palpitation, No orthopnea or PND.  No cough or wheezing. GI: No N/V/D, no constipation, No abdominal pain, no melena or hematochezia   : Denied any dysuria, hematuria, flank pain, discharge, or incontinence. Skin: denied any rash, ulcer, Hirsute, or hyperpigmentation. MSK: denied any joint deformity, joint pain/swelling, muscle pain, or back pain. Neuro: no numbness, no tingling, no weakness, _    OBJECTIVE    /77   Pulse 73   Ht 6' 8\" (2.032 m)   Wt 294 lb (133.4 kg)   SpO2 93%   BMI 32.30 kg/m²   BP Readings from Last 4 Encounters:   12/13/22 112/77   09/13/22 122/77   05/19/22 122/88   03/10/22 121/83     Wt Readings from Last 6 Encounters:   12/13/22 294 lb (133.4 kg)   09/13/22 292 lb (132.5 kg)   05/19/22 298 lb (135.2 kg)   03/10/22 295 lb (133.8 kg)   12/22/21 (!) 307 lb (139.3 kg)       Physical examination:  General: awake alert, oriented x3, no abnormal position or movements. HEENT: normocephalic non-traumatic, no exophthalmos   Neck: supple, no LN enlargement, no thyromegaly, no thyroid tenderness, no JVD. Pulm: Clear equal air entry no added sounds, no wheezing or rhonchi    CVS: S1 + S2, no murmur, no heave. Dorsalis pedis pulse palpable   Abd: soft lax, no tenderness, no organomegaly, audible bowel sounds. Skin: warm, no lesions, no rash.  No callus, no Ulcers, No acanthosis nigricans  Musculoskeletal: No back tenderness, no kyphosis/scoliosis    Neuro: CN intact, Monofilament sensation normal bilateral , muscle power normal  Psych: normal mood, and affect      Review of Laboratory Data:  I personally reviewed the following lab:  Lab Results   Component Value Date/Time    WBC 12.6 (H) 03/10/2022 05:14 PM    RBC 5.11 03/10/2022 05:14 PM    HGB 14.8 03/10/2022 05:14 PM    HCT 43.6 03/10/2022 05:14 PM    MCV 85.3 03/10/2022 05:14 PM    MCH 29.0 03/10/2022 05:14 PM    MCHC 33.9 03/10/2022 05:14 PM    RDW 12.6 03/10/2022 05:14 PM     03/10/2022 05:14 PM    MPV 10.0 03/10/2022 05:14 PM      Lab Results   Component Value Date/Time     05/12/2022 12:00 AM    K 4.2 05/12/2022 12:00 AM    CO2 30 (H) 03/10/2022 05:39 PM    CREATININE 1.01 05/12/2022 12:00 AM    CALCIUM 10.3 05/12/2022 12:00 AM    LABGLOM 77.6 05/12/2022 12:00 AM    LABGLOM >60 03/10/2022 05:39 PM    GFRAA >60 03/10/2022 05:39 PM      No results found for: TSH, T4FREE, M9ZIYLU, FT3, S5CRCVL, TSI, TPOABS, THGAB  Lab Results   Component Value Date/Time    LABA1C 8.0 12/13/2022 02:21 PM    MALBCR 8.2 05/12/2022 12:00 AM    LABCREA 13.8 05/12/2022 12:00 AM     Lab Results   Component Value Date/Time    LABA1C 8.0 12/13/2022 02:21 PM    LABA1C 9.4 09/13/2022 01:53 PM    LABA1C 7.9 05/12/2022 12:00 AM     Lab Results   Component Value Date/Time    TRIG 109 05/12/2022 12:00 AM    HDL 46 05/12/2022 12:00 AM    LDLCALC 131 05/12/2022 12:00 AM    CHOL 199 05/12/2022 12:00 AM     Lab Results   Component Value Date/Time    VITD25 20.0 05/12/2022 12:00 AM       ASSESSMENT & RECOMMENDATIONS   Asha Harden, a 46 y.o.-old male seen in for the following issues       Assessment:      Diagnosis Orders   1. Type 2 diabetes mellitus without complication, without long-term current use of insulin ([facility])  POCT glycosylated hemoglobin (Hb A1C)    Comprehensive Metabolic Panel    Lipid Panel    Microalbumin / Creatinine Urine Ratio      2. Vitamin D deficiency  Vitamin D 25 Hydroxy      3. Dietary noncompliance        4. Class 1 obesity due to excess calories without serious comorbidity with body mass index (BMI) of 32.0 to 32.9 in adult        5. Dyslipidemia associated with type 2 diabetes mellitus ([facility]              Plan:     1. Type 2 diabetes mellitus without complication, without long-term current use of insulin ([facility]   Patient diabetes improved from previous.   Hemoglobin A1c 8%  Continue glimepiride 4 mg 1 tablet twice daily  Continue Lantus to pen 22 units at night   Reattempt Jardiance but at lower dose of 10 mg daily  Patient to call if he cannot tolerate  Advised to keep hydrated drink plenty of water  Check blood sugars BID   Hemoglobin A1c goal 6.5 to 7%  Encouraged diet and exercise   2. Vitamin D deficiency   Continue vitamin D 1000 IU daily  Counseled on the importance of vitamin D and bone health  Check vitamin D   3. Dietary noncompliance   Discussed with patient the importance of eating consistent carbohydrate meals, avoiding high glycemic index food. Also, discussed with patient the risk and negative consequences of dietary noncompliance on blood glucose control, blood pressure and weight  Patient will meet with dietitian   4. Class 1 obesity due to excess calories without serious comorbidity with body mass index (BMI) of 32.0 to 32.9 in adult   Discussed lifestyle changes including diet and exercise with patient in depth. Also discussed with patient cardiovascular risk associated with obesity   5. Dyslipidemia  Continue atorvastatin 20 mg at night  Will reassess lipids       I personally spent > 30 minutes reviewing  external notes from PCP and other patient's care team providers, and personally interpreted labs associated with the above diagnosis. I also ordered labs to further assess and manage the above addressed medical conditions. Return in about 3 months (around 3/13/2023). The above issues were reviewed with the patient who understood and agreed with the plan. Thank you for allowing us to participate in the care of this patient. Please do not hesitate to contact us with any additional questions. JASEN Jolley     [facility] - BEHAVIORAL HEALTH SERVICES Diabetes Care and Endocrinology   [address] [facility] [address]   Phone: [phone]  Fax: [phone]  --------------------------------------------  An electronic signature was used to authenticate this note.  JASEN Jolley on 12/13/2022 at 2:39 PM

## 2022-12-28 LAB
ALBUMIN SERPL-MCNC: 3.8 G/DL
ALP BLD-CCNC: NORMAL U/L
ALT SERPL-CCNC: NORMAL U/L
ANION GAP SERPL CALCULATED.3IONS-SCNC: NORMAL MMOL/L
AST SERPL-CCNC: NORMAL U/L
BILIRUB SERPL-MCNC: NORMAL MG/DL
BUN BLDV-MCNC: NORMAL MG/DL
CALCIUM SERPL-MCNC: 10.1 MG/DL
CHLORIDE BLD-SCNC: 107 MMOL/L
CHOLESTEROL, TOTAL: 209 MG/DL
CHOLESTEROL/HDL RATIO: NORMAL
CO2: NORMAL
CREAT SERPL-MCNC: 0.99 MG/DL
CREATININE, URINE: 114
GFR CALCULATED: NORMAL
GLUCOSE BLD-MCNC: NORMAL MG/DL
HDLC SERPL-MCNC: 48 MG/DL (ref 35–70)
LDL CHOLESTEROL CALCULATED: 141 MG/DL (ref 0–160)
MICROALBUMIN/CREAT 24H UR: 0.57 MG/G{CREAT}
MICROALBUMIN/CREAT UR-RTO: 5
NONHDLC SERPL-MCNC: NORMAL MG/DL
POTASSIUM SERPL-SCNC: 3.6 MMOL/L
SODIUM BLD-SCNC: 141 MMOL/L
TOTAL PROTEIN: NORMAL
TRIGL SERPL-MCNC: 101 MG/DL
VITAMIN D 25-HYDROXY: 18.3
VITAMIN D2, 25 HYDROXY: NORMAL
VITAMIN D3,25 HYDROXY: NORMAL
VLDLC SERPL CALC-MCNC: NORMAL MG/DL

## 2022-12-29 ENCOUNTER — TELEPHONE (OUTPATIENT)
Dept: ENDOCRINOLOGY | Age: 52
End: 2022-12-29

## 2022-12-29 DIAGNOSIS — E11.9 TYPE 2 DIABETES MELLITUS WITHOUT COMPLICATION, WITHOUT LONG-TERM CURRENT USE OF INSULIN (HCC): ICD-10-CM

## 2022-12-29 DIAGNOSIS — E55.9 VITAMIN D DEFICIENCY: ICD-10-CM

## 2022-12-29 RX ORDER — ERGOCALCIFEROL 1.25 MG/1
50000 CAPSULE ORAL WEEKLY
Qty: 4 CAPSULE | Refills: 1 | Status: SHIPPED | OUTPATIENT
Start: 2022-12-29

## 2022-12-29 RX ORDER — ATORVASTATIN CALCIUM 40 MG/1
40 TABLET, FILM COATED ORAL DAILY
Qty: 90 TABLET | Refills: 3 | Status: SHIPPED | OUTPATIENT
Start: 2022-12-29

## 2022-12-29 NOTE — TELEPHONE ENCOUNTER
----- Message from JASEN Llamas - CNS sent at 12/29/2022 10:34 AM EST -----  Please call patient and inform him vitamin D is still low.   Please have patient take Drisdol 50,000 IU once weekly for 8 weeks  Patient can then increase vitamin D to 2000 IU daily over-the-counter thereafter  Also his cholesterol is not at goal.  Please have patient increase atorvastatin to 40 mg at night  Rx sent

## 2023-01-13 ENCOUNTER — TELEPHONE (OUTPATIENT)
Dept: ENDOCRINOLOGY | Age: 53
End: 2023-01-13

## 2023-01-13 NOTE — TELEPHONE ENCOUNTER
I remember being told that [place] might cause an UTI. I am having some discomfort in my side and my urine is a little off. Wasnt sure if this was a side effect of the medicine or not. I do have a history of kidney stones, but wasnt sure where to start with this problem.

## 2023-01-13 NOTE — TELEPHONE ENCOUNTER
Talked to [employer]. He instructed the patient to hold the jardiance over the weekend. The patient does not think that it is a kidney stone. I called pt and told him to hold it over the weekend until nelia came back to look at his chart to see what to switch him to.

## 2023-01-16 NOTE — TELEPHONE ENCOUNTER
Please call patient and inform him I would like him to work on diet and exercise at this point  If symptoms resolved after [place] was held then we can continue to hold it but continue other medications

## 2023-01-16 NOTE — TELEPHONE ENCOUNTER
Pt is going to start [address] [place] [address] again as he thinks he was sick due to illness not Jardiance. He will call if symptoms come back.

## 2023-02-04 DIAGNOSIS — E11.9 TYPE 2 DIABETES MELLITUS WITHOUT COMPLICATION, WITHOUT LONG-TERM CURRENT USE OF INSULIN (HCC): Primary | ICD-10-CM

## 2023-02-06 RX ORDER — INSULIN GLARGINE 100 [IU]/ML
22 INJECTION, SOLUTION SUBCUTANEOUS NIGHTLY
Qty: 15 ML | Refills: 5 | Status: SHIPPED | OUTPATIENT
Start: 2023-02-06

## 2023-03-13 ENCOUNTER — OFFICE VISIT (OUTPATIENT)
Dept: ENDOCRINOLOGY | Age: 53
End: 2023-03-13
Payer: COMMERCIAL

## 2023-03-13 VITALS
HEART RATE: 86 BPM | HEIGHT: 78 IN | SYSTOLIC BLOOD PRESSURE: 123 MMHG | OXYGEN SATURATION: 94 % | WEIGHT: 296 LBS | BODY MASS INDEX: 34.25 KG/M2 | DIASTOLIC BLOOD PRESSURE: 78 MMHG

## 2023-03-13 DIAGNOSIS — E11.9 TYPE 2 DIABETES MELLITUS WITHOUT COMPLICATION, WITHOUT LONG-TERM CURRENT USE OF INSULIN (HCC): Primary | ICD-10-CM

## 2023-03-13 DIAGNOSIS — E55.9 VITAMIN D DEFICIENCY: ICD-10-CM

## 2023-03-13 DIAGNOSIS — E78.5 DYSLIPIDEMIA ASSOCIATED WITH TYPE 2 DIABETES MELLITUS (HCC): ICD-10-CM

## 2023-03-13 DIAGNOSIS — E11.69 DYSLIPIDEMIA ASSOCIATED WITH TYPE 2 DIABETES MELLITUS (HCC): ICD-10-CM

## 2023-03-13 DIAGNOSIS — E66.09 CLASS 1 OBESITY DUE TO EXCESS CALORIES WITHOUT SERIOUS COMORBIDITY WITH BODY MASS INDEX (BMI) OF 32.0 TO 32.9 IN ADULT: ICD-10-CM

## 2023-03-13 LAB — HBA1C MFR BLD: 8 %

## 2023-03-13 PROCEDURE — 99214 OFFICE O/P EST MOD 30 MIN: CPT | Performed by: CLINICAL NURSE SPECIALIST

## 2023-03-13 PROCEDURE — 83036 HEMOGLOBIN GLYCOSYLATED A1C: CPT | Performed by: CLINICAL NURSE SPECIALIST

## 2023-03-13 PROCEDURE — 3052F HG A1C>EQUAL 8.0%<EQUAL 9.0%: CPT | Performed by: CLINICAL NURSE SPECIALIST

## 2023-03-13 RX ORDER — INSULIN GLARGINE 100 [IU]/ML
26 INJECTION, SOLUTION SUBCUTANEOUS NIGHTLY
Qty: 15 ML | Refills: 5
Start: 2023-03-13

## 2023-03-13 RX ORDER — PEN NEEDLE, DIABETIC 30 GX3/16"
NEEDLE, DISPOSABLE MISCELLANEOUS
Qty: 100 EACH | Refills: 3 | Status: SHIPPED | OUTPATIENT
Start: 2023-03-13

## 2023-03-13 NOTE — PROGRESS NOTES
[address] [facility] Department of Endocrinology Diabetes and Metabolism   [address] [facility] [address]   Phone: [phone]  Fax: [phone]    Date of Service: 3/13/2023    Primary Care Physician: Maddie Mandel MD  Referring physician: No ref. provider found  Provider: JASEN Ivey     Reason for the visit:  Type 2 DM       History of Present Illness: The history is provided by the patient. No  was used. Accuracy of the patient data is excellent.   Linus Monahan is a very pleasant 48 y.o. male seen today for diabetes management     Linus Monahan was diagnosed with diabetes in 10/2019 and currently on Glimepiride 4 mg BID, Lantus 22 units at night, Jardiance 10 mg daily     Trulciity stopped d/t hx of gastroparesis   Patient attempted metformin in the past however this caused severe GI upset   The patient has been checking blood sugar daily        Checking BG daily   BG 's   Most recent A1c results summarized below  Lab Results   Component Value Date/Time    LABA1C 8.0 03/13/2023 04:04 PM    LABA1C 8.0 12/13/2022 02:21 PM    LABA1C 9.4 09/13/2022 01:53 PM       Patient has had no hypoglycemic episodes  The patient hasn't been mindful of what has been eating and wasn't following diabetes diet    I reviewed current medications and the patient has no issues with diabetes medications  Linus Monahan is up to date with eye exam and denied any history of diabetic retinopathy   The patient performs  own feet care  Microvascular complications:  No Retinopathy, Nephropathy or Neuropathy   Macrovascular complications: no CAD   The patient receives Flushot every year    No HX of pancreatitis  No Hx of MTC  + HX of gastroparesis         PAST MEDICAL HISTORY   Past Medical History:   Diagnosis Date    Acid reflux     Diabetes mellitus ([address]     Hypertension     Kidney stone        PAST SURGICAL HISTORY   Past Surgical History:   Procedure Laterality Date LITHOTRIPSY         SOCIAL HISTORY   Tobacco:   reports that he has never smoked. He has never used smokeless tobacco.  Alcohol:   reports no history of alcohol use. Drugs:   reports no history of drug use. FAMILY HISTORY   No family history on file. ALLERGIES AND DRUG REACTIONS   No Known Allergies    CURRENT MEDICATIONS   Current Outpatient Medications   Medication Sig Dispense Refill    insulin glargine (LANTUS SOLOSTAR) 100 UNIT/ML injection pen Inject 26 Units into the skin nightly 15 mL 5    Insulin Pen Needle (PEN NEEDLES) 32G X 4 MM MISC Once per day 100 each 3    empagliflozin (JARDIANCE) 10 MG tablet Take 1 tablet by mouth daily 90 tablet 3    glimepiride (AMARYL) 4 MG tablet glimepiride 4 mg 1 tablet  tablet 1    atorvastatin (LIPITOR) 40 MG tablet Take 1 tablet by mouth daily 90 tablet 3    ergocalciferol (ERGOCALCIFEROL) 1.25 MG (02104 UT) capsule Take 1 capsule by mouth once a week 4 capsule 1    Naproxen Sodium (ALEVE PO) Take by mouth      blood glucose test strips (ASCENSIA AUTODISC VI;ONE TOUCH ULTRA TEST VI) strip Accu-Chek Guide test strips use to test blood sugars 2x daily 100 each 6    influenza quadrivalent-split vaccine (FLULAVAL QUADRIVALENT) SUSP injection Flulaval Quad 7565-1091 60 mcg (15 mcg x 4)/0.5 mL intramuscular susp. omeprazole (PRILOSEC) 20 MG delayed release capsule TAKE ONE CAPSULE BY MOUTH EVERY DAY      tamsulosin (FLOMAX) 0.4 MG capsule tamsulosin 0.4 mg capsule      valsartan-hydroCHLOROthiazide (DIOVAN-HCT) 320-25 MG per tablet valsartan 320 mg-hydrochlorothiazide 25 mg tablet       No current facility-administered medications for this visit. Review of Systems  Constitutional: No fever, no chills, no diaphoresis, no generalized weakness. HEENT: No blurred vision, No sore throat, no ear pain, no hair loss  Neck: denied any neck swelling, difficulty swallowing,   Cardio-pulmonary: No CP, SOB or palpitation, No orthopnea or PND.  No cough or wheezing. GI: No N/V/D, no constipation, No abdominal pain, no melena or hematochezia   : Denied any dysuria, hematuria, flank pain, discharge, or incontinence. Skin: denied any rash, ulcer, Hirsute, or hyperpigmentation. MSK: denied any joint deformity, joint pain/swelling, muscle pain, or back pain. Neuro: no numbness, no tingling, no weakness, _    OBJECTIVE    /78   Pulse 86   Ht 6' 8\" (2.032 m)   Wt 296 lb (134.3 kg)   SpO2 94%   BMI 32.52 kg/m²   BP Readings from Last 4 Encounters:   03/13/23 123/78   12/13/22 112/77   09/13/22 122/77   05/19/22 122/88     Wt Readings from Last 6 Encounters:   03/13/23 296 lb (134.3 kg)   12/13/22 294 lb (133.4 kg)   09/13/22 292 lb (132.5 kg)   05/19/22 298 lb (135.2 kg)   03/10/22 295 lb (133.8 kg)   12/22/21 (!) 307 lb (139.3 kg)       Physical examination:  General: awake alert, oriented x3, no abnormal position or movements. HEENT: normocephalic non-traumatic, no exophthalmos   Neck: supple, no LN enlargement, no thyromegaly, no thyroid tenderness, no JVD. Pulm: Clear equal air entry no added sounds, no wheezing or rhonchi    CVS: S1 + S2, no murmur, no heave. Dorsalis pedis pulse palpable   Abd: soft lax, no tenderness, no organomegaly, audible bowel sounds. Skin: warm, no lesions, no rash.  No callus, no Ulcers, No acanthosis nigricans  Musculoskeletal: No back tenderness, no kyphosis/scoliosis    Neuro: CN intact, Monofilament sensation normal bilateral , muscle power normal  Psych: normal mood, and affect      Review of Laboratory Data:  I personally reviewed the following lab:  Lab Results   Component Value Date/Time    WBC 12.6 (H) 03/10/2022 05:14 PM    RBC 5.11 03/10/2022 05:14 PM    HGB 14.8 03/10/2022 05:14 PM    HCT 43.6 03/10/2022 05:14 PM    MCV 85.3 03/10/2022 05:14 PM    MCH 29.0 03/10/2022 05:14 PM    MCHC 33.9 03/10/2022 05:14 PM    RDW 12.6 03/10/2022 05:14 PM     03/10/2022 05:14 PM    MPV 10.0 03/10/2022 05:14 PM      Lab Results   Component Value Date/Time     12/28/2022 12:00 AM    K 3.6 12/28/2022 12:00 AM    CO2 30 (H) 03/10/2022 05:39 PM    CREATININE 0.99 12/28/2022 12:00 AM    CALCIUM 10.1 12/28/2022 12:00 AM    LABGLOM 77.6 05/12/2022 12:00 AM    LABGLOM >60 03/10/2022 05:39 PM    GFRAA >60 03/10/2022 05:39 PM      No results found for: TSH, T4FREE, L8NXNME, FT3, L3LXATL, TSI, TPOABS, THGAB  Lab Results   Component Value Date/Time    LABA1C 8.0 03/13/2023 04:04 PM    MALBCR 5.0 12/28/2022 12:00 AM    LABCREA 114.0 12/28/2022 12:00 AM     Lab Results   Component Value Date/Time    LABA1C 8.0 03/13/2023 04:04 PM    LABA1C 8.0 12/13/2022 02:21 PM    LABA1C 9.4 09/13/2022 01:53 PM     Lab Results   Component Value Date/Time    TRIG 101 12/28/2022 12:00 AM    HDL 48 12/28/2022 12:00 AM    LDLCALC 141 12/28/2022 12:00 AM    CHOL 209 12/28/2022 12:00 AM     Lab Results   Component Value Date/Time    VITD25 18.3 12/28/2022 12:00 AM    VITD25 20.0 05/12/2022 12:00 AM       ASSESSMENT & RECOMMENDATIONS   Marleni Lin, a 48 y.o.-old male seen in for the following issues       Assessment:      Diagnosis Orders   1. Type 2 diabetes mellitus without complication, without long-term current use of insulin ([facility])  POCT glycosylated hemoglobin (Hb A1C)    insulin glargine (LANTUS SOLOSTAR) 100 UNIT/ML injection pen      2. Vitamin D deficiency        3. Class 1 obesity due to excess calories without serious comorbidity with body mass index (BMI) of 32.0 to 32.9 in adult        4. Dyslipidemia associated with type 2 diabetes mellitus ([address]                Plan:     1. Type 2 diabetes mellitus without complication, without long-term current use of insulin ([address]   Patient diabetes variable. Hemoglobin A1c 8%  Continue glimepiride 4 mg 1 tablet twice daily  Increase Lantus to pen 26 units at night   Continue Jardiance 10 mg daily  Check blood sugars BID   Hemoglobin A1c goal 6.5 to 7%  Encouraged diet and exercise   2.  Vitamin D deficiency   Continue vitamin D 2000 IU daily  Counseled on the importance of vitamin D and bone health   3. Dietary noncompliance   Discussed with patient the importance of eating consistent carbohydrate meals, avoiding high glycemic index food. Also, discussed with patient the risk and negative consequences of dietary noncompliance on blood glucose control, blood pressure and weight  Patient will meet with dietitian   4. Class 1 obesity due to excess calories without serious comorbidity with body mass index (BMI) of 32.0 to 32.9 in adult   Discussed lifestyle changes including diet and exercise with patient in depth. Also discussed with patient cardiovascular risk associated with obesity   5.      Dyslipidemia  Continue atorvastatin 40 mg at night  Encourage diet and exercise        I personally spent > 30 minutes reviewing  external notes from PCP and other patient's care team providers, and personally interpreted labs associated with the above diagnosis. I also ordered labs to further assess and manage the above addressed medical conditions.     Return in about 3 months (around 6/13/2023).    The above issues were reviewed with the patient who understood and agreed with the plan.    Thank you for allowing us to participate in the care of this patient. Please do not hesitate to contact us with any additional questions.     JASEN Silverio     [facility]   [address] [place] [address]   Phone: [phone]  Fax: [phone]  --------------------------------------------  An electronic signature was used to authenticate this note. JASEN Silverio on 3/13/2023 at 4:18 PM   None needed

## 2023-05-11 DIAGNOSIS — E11.9 TYPE 2 DIABETES MELLITUS WITHOUT COMPLICATION, WITHOUT LONG-TERM CURRENT USE OF INSULIN (HCC): ICD-10-CM

## 2023-06-27 ENCOUNTER — OFFICE VISIT (OUTPATIENT)
Dept: ENDOCRINOLOGY | Age: 53
End: 2023-06-27
Payer: COMMERCIAL

## 2023-06-27 VITALS
HEART RATE: 83 BPM | OXYGEN SATURATION: 97 % | DIASTOLIC BLOOD PRESSURE: 74 MMHG | HEIGHT: 78 IN | SYSTOLIC BLOOD PRESSURE: 113 MMHG | BODY MASS INDEX: 34.13 KG/M2 | WEIGHT: 295 LBS

## 2023-06-27 DIAGNOSIS — E11.69 DYSLIPIDEMIA ASSOCIATED WITH TYPE 2 DIABETES MELLITUS (HCC): ICD-10-CM

## 2023-06-27 DIAGNOSIS — E55.9 VITAMIN D DEFICIENCY: ICD-10-CM

## 2023-06-27 DIAGNOSIS — E78.5 DYSLIPIDEMIA ASSOCIATED WITH TYPE 2 DIABETES MELLITUS (HCC): ICD-10-CM

## 2023-06-27 DIAGNOSIS — E66.09 CLASS 1 OBESITY DUE TO EXCESS CALORIES WITHOUT SERIOUS COMORBIDITY WITH BODY MASS INDEX (BMI) OF 32.0 TO 32.9 IN ADULT: ICD-10-CM

## 2023-06-27 DIAGNOSIS — E11.9 TYPE 2 DIABETES MELLITUS WITHOUT COMPLICATION, WITHOUT LONG-TERM CURRENT USE OF INSULIN (HCC): Primary | ICD-10-CM

## 2023-06-27 LAB — HBA1C MFR BLD: 8.2 %

## 2023-06-27 PROCEDURE — 99214 OFFICE O/P EST MOD 30 MIN: CPT | Performed by: CLINICAL NURSE SPECIALIST

## 2023-06-27 PROCEDURE — 3052F HG A1C>EQUAL 8.0%<EQUAL 9.0%: CPT | Performed by: CLINICAL NURSE SPECIALIST

## 2023-06-27 PROCEDURE — 83037 HB GLYCOSYLATED A1C HOME DEV: CPT | Performed by: CLINICAL NURSE SPECIALIST

## 2023-08-16 ENCOUNTER — PATIENT MESSAGE (OUTPATIENT)
Dept: ENDOCRINOLOGY | Age: 53
End: 2023-08-16

## 2023-08-16 DIAGNOSIS — E11.9 TYPE 2 DIABETES MELLITUS WITHOUT COMPLICATION, WITHOUT LONG-TERM CURRENT USE OF INSULIN (HCC): Primary | ICD-10-CM

## 2023-08-17 RX ORDER — INSULIN GLARGINE 100 [IU]/ML
26 INJECTION, SOLUTION SUBCUTANEOUS NIGHTLY
Qty: 5 ADJUSTABLE DOSE PRE-FILLED PEN SYRINGE | Refills: 5 | Status: SHIPPED | OUTPATIENT
Start: 2023-08-17

## 2023-10-04 DIAGNOSIS — E11.9 TYPE 2 DIABETES MELLITUS WITHOUT COMPLICATION, WITHOUT LONG-TERM CURRENT USE OF INSULIN (HCC): Primary | ICD-10-CM

## 2023-10-04 NOTE — TELEPHONE ENCOUNTER
Spoke with patient said they needed to switch pharmacy's due to insurance coverage jardance sent to new pharmacy.

## 2023-10-10 DIAGNOSIS — E11.9 TYPE 2 DIABETES MELLITUS WITHOUT COMPLICATION, WITHOUT LONG-TERM CURRENT USE OF INSULIN (HCC): ICD-10-CM

## 2023-10-10 RX ORDER — GLIMEPIRIDE 4 MG/1
TABLET ORAL
Qty: 180 TABLET | Refills: 1 | Status: SHIPPED | OUTPATIENT
Start: 2023-10-10

## 2023-10-16 DIAGNOSIS — E11.9 TYPE 2 DIABETES MELLITUS WITHOUT COMPLICATION, WITHOUT LONG-TERM CURRENT USE OF INSULIN (HCC): ICD-10-CM

## 2023-10-24 RX ORDER — INSULIN GLARGINE 100 [IU]/ML
26 INJECTION, SOLUTION SUBCUTANEOUS NIGHTLY
Qty: 5 ADJUSTABLE DOSE PRE-FILLED PEN SYRINGE | Refills: 5 | Status: SHIPPED | OUTPATIENT
Start: 2023-10-24

## 2023-11-03 ENCOUNTER — TELEPHONE (OUTPATIENT)
Dept: ENDOCRINOLOGY | Age: 53
End: 2023-11-03

## 2023-11-13 DIAGNOSIS — E11.9 TYPE 2 DIABETES MELLITUS WITHOUT COMPLICATION, WITHOUT LONG-TERM CURRENT USE OF INSULIN (HCC): ICD-10-CM

## 2023-11-14 RX ORDER — BLOOD SUGAR DIAGNOSTIC
STRIP MISCELLANEOUS
Qty: 100 EACH | Refills: 5 | Status: SHIPPED | OUTPATIENT
Start: 2023-11-14

## 2024-01-01 DIAGNOSIS — E78.5 DYSLIPIDEMIA ASSOCIATED WITH TYPE 2 DIABETES MELLITUS (HCC): Primary | ICD-10-CM

## 2024-01-01 DIAGNOSIS — E11.69 DYSLIPIDEMIA ASSOCIATED WITH TYPE 2 DIABETES MELLITUS (HCC): Primary | ICD-10-CM

## 2024-01-02 RX ORDER — ATORVASTATIN CALCIUM 40 MG/1
40 TABLET, FILM COATED ORAL DAILY
Qty: 90 TABLET | Refills: 3 | Status: SHIPPED | OUTPATIENT
Start: 2024-01-02

## 2024-02-21 DIAGNOSIS — E11.9 TYPE 2 DIABETES MELLITUS WITHOUT COMPLICATION, WITHOUT LONG-TERM CURRENT USE OF INSULIN (HCC): Primary | ICD-10-CM

## 2024-02-21 RX ORDER — INSULIN GLARGINE 100 [IU]/ML
26 INJECTION, SOLUTION SUBCUTANEOUS NIGHTLY
Qty: 30 ML | Refills: 3 | Status: SHIPPED | OUTPATIENT
Start: 2024-02-21

## 2024-03-15 DIAGNOSIS — E11.69 DYSLIPIDEMIA ASSOCIATED WITH TYPE 2 DIABETES MELLITUS (HCC): ICD-10-CM

## 2024-03-15 DIAGNOSIS — E78.5 DYSLIPIDEMIA ASSOCIATED WITH TYPE 2 DIABETES MELLITUS (HCC): ICD-10-CM

## 2024-03-15 DIAGNOSIS — E11.9 TYPE 2 DIABETES MELLITUS WITHOUT COMPLICATION, WITHOUT LONG-TERM CURRENT USE OF INSULIN (HCC): ICD-10-CM

## 2024-03-15 DIAGNOSIS — E55.9 VITAMIN D DEFICIENCY: ICD-10-CM

## 2024-03-15 LAB
ANION GAP SERPL CALCULATED.3IONS-SCNC: 13 MMOL/L (ref 7–16)
BUN BLDV-MCNC: 13 MG/DL (ref 6–20)
CALCIUM SERPL-MCNC: 9.9 MG/DL (ref 8.6–10.2)
CHLORIDE BLD-SCNC: 102 MMOL/L (ref 98–107)
CHOLESTEROL: 126 MG/DL
CO2: 24 MMOL/L (ref 22–29)
CREAT SERPL-MCNC: 0.8 MG/DL (ref 0.7–1.2)
CREATININE URINE: 97 MG/DL (ref 40–278)
GFR SERPL CREATININE-BSD FRML MDRD: >60 ML/MIN/1.73M2
GLUCOSE BLD-MCNC: 143 MG/DL (ref 74–99)
HCT VFR BLD CALC: 47.8 % (ref 37–54)
HDLC SERPL-MCNC: 45 MG/DL
HEMOGLOBIN: 15.5 G/DL (ref 12.5–16.5)
LDL CHOLESTEROL: 64 MG/DL
MCH RBC QN AUTO: 28.2 PG (ref 26–35)
MCHC RBC AUTO-ENTMCNC: 32.4 G/DL (ref 32–34.5)
MCV RBC AUTO: 87.1 FL (ref 80–99.9)
MICROALBUMIN/CREAT 24H UR: <12 MG/L (ref 0–19)
MICROALBUMIN/CREAT UR-RTO: NORMAL MCG/MG CREAT (ref 0–30)
PDW BLD-RTO: 13.5 % (ref 11.5–15)
PLATELET # BLD: 355 K/UL (ref 130–450)
PMV BLD AUTO: 10.7 FL (ref 7–12)
POTASSIUM SERPL-SCNC: 4 MMOL/L (ref 3.5–5)
RBC # BLD: 5.49 M/UL (ref 3.8–5.8)
SODIUM BLD-SCNC: 139 MMOL/L (ref 132–146)
TRIGL SERPL-MCNC: 83 MG/DL
VITAMIN D 25-HYDROXY: 12.9 NG/ML (ref 30–100)
VLDLC SERPL CALC-MCNC: 17 MG/DL
WBC # BLD: 12.4 K/UL (ref 4.5–11.5)

## 2024-03-19 ENCOUNTER — TELEPHONE (OUTPATIENT)
Dept: ENDOCRINOLOGY | Age: 54
End: 2024-03-19

## 2024-03-19 DIAGNOSIS — E55.9 VITAMIN D DEFICIENCY: Primary | ICD-10-CM

## 2024-03-19 RX ORDER — ERGOCALCIFEROL 1.25 MG/1
50000 CAPSULE ORAL WEEKLY
Qty: 12 CAPSULE | Refills: 3 | Status: SHIPPED | OUTPATIENT
Start: 2024-03-19

## 2024-03-19 NOTE — TELEPHONE ENCOUNTER
Please call t and inform him I reviewed his labs and his Vit D remains low and I called him in weekly capsule x 12 weeks

## 2024-03-25 ENCOUNTER — OFFICE VISIT (OUTPATIENT)
Dept: ENDOCRINOLOGY | Age: 54
End: 2024-03-25
Payer: COMMERCIAL

## 2024-03-25 VITALS — HEIGHT: 78 IN | WEIGHT: 296 LBS | BODY MASS INDEX: 34.25 KG/M2

## 2024-03-25 DIAGNOSIS — Z91.119 DIETARY NONCOMPLIANCE: ICD-10-CM

## 2024-03-25 DIAGNOSIS — E55.9 VITAMIN D DEFICIENCY: ICD-10-CM

## 2024-03-25 DIAGNOSIS — E11.65 TYPE 2 DIABETES MELLITUS WITH HYPERGLYCEMIA, WITHOUT LONG-TERM CURRENT USE OF INSULIN (HCC): Primary | ICD-10-CM

## 2024-03-25 DIAGNOSIS — E66.09 CLASS 1 OBESITY DUE TO EXCESS CALORIES WITHOUT SERIOUS COMORBIDITY WITH BODY MASS INDEX (BMI) OF 32.0 TO 32.9 IN ADULT: ICD-10-CM

## 2024-03-25 DIAGNOSIS — E11.69 DYSLIPIDEMIA ASSOCIATED WITH TYPE 2 DIABETES MELLITUS (HCC): ICD-10-CM

## 2024-03-25 DIAGNOSIS — E78.5 DYSLIPIDEMIA ASSOCIATED WITH TYPE 2 DIABETES MELLITUS (HCC): ICD-10-CM

## 2024-03-25 LAB — HBA1C MFR BLD: 8.7 %

## 2024-03-25 PROCEDURE — 3052F HG A1C>EQUAL 8.0%<EQUAL 9.0%: CPT | Performed by: NURSE PRACTITIONER

## 2024-03-25 PROCEDURE — 99214 OFFICE O/P EST MOD 30 MIN: CPT | Performed by: NURSE PRACTITIONER

## 2024-03-25 PROCEDURE — 83036 HEMOGLOBIN GLYCOSYLATED A1C: CPT | Performed by: NURSE PRACTITIONER

## 2024-03-25 NOTE — PROGRESS NOTES
tablet valsartan 320 mg-hydrochlorothiazide 25 mg tablet       No current facility-administered medications for this visit.       Review of Systems  Constitutional: No fever, no chills, no diaphoresis, no generalized weakness.  HEENT: No blurred vision, No sore throat, no ear pain, no hair loss  Neck: denied any neck swelling, difficulty swallowing,   Cardio-pulmonary: No CP, SOB or palpitation, No orthopnea or PND. No cough or wheezing.  GI: No N/V/D, no constipation, No abdominal pain, no melena or hematochezia   : Denied any dysuria, hematuria, flank pain, discharge, or incontinence.   Skin: denied any rash, ulcer, Hirsute, or hyperpigmentation.   MSK: denied any joint deformity, joint pain/swelling, muscle pain, or back pain.  Neuro: no numbness, no tingling, no weakness, _    OBJECTIVE    Ht 2.032 m (6' 8\")   Wt 134.3 kg (296 lb)   BMI 32.52 kg/m²   BP Readings from Last 4 Encounters:   12/21/23 105/72   06/27/23 113/74   03/13/23 123/78   12/13/22 112/77     Wt Readings from Last 6 Encounters:   03/25/24 134.3 kg (296 lb)   12/21/23 135.6 kg (299 lb)   06/27/23 133.8 kg (295 lb)   03/13/23 134.3 kg (296 lb)   12/13/22 133.4 kg (294 lb)   09/13/22 132.5 kg (292 lb)       Physical examination:  General: awake alert, oriented x3, no abnormal position or movements.  HEENT: normocephalic non-traumatic, no exophthalmos   Neck: supple, no LN enlargement, no thyromegaly, no thyroid tenderness, no JVD.  Pulm: Clear equal air entry no added sounds, no wheezing or rhonchi    CVS: S1 + S2, no murmur, no heave. Dorsalis pedis pulse palpable   Abd: soft lax, no tenderness, no organomegaly, audible bowel sounds.   Skin: warm, no lesions, no rash. No callus, no Ulcers, No acanthosis nigricans  Musculoskeletal: No back tenderness, no kyphosis/scoliosis    Neuro: CN intact,sensation normal bilateral , muscle power normal  Psych: normal mood, and affect      Review of Laboratory Data:  I personally reviewed the following

## 2024-03-28 DIAGNOSIS — E11.9 TYPE 2 DIABETES MELLITUS WITHOUT COMPLICATION, WITHOUT LONG-TERM CURRENT USE OF INSULIN (HCC): ICD-10-CM

## 2024-03-29 RX ORDER — INSULIN GLARGINE 100 [IU]/ML
26 INJECTION, SOLUTION SUBCUTANEOUS NIGHTLY
Qty: 30 ML | Refills: 3 | Status: SHIPPED | OUTPATIENT
Start: 2024-03-29

## 2024-04-05 DIAGNOSIS — E11.9 TYPE 2 DIABETES MELLITUS WITHOUT COMPLICATION, WITHOUT LONG-TERM CURRENT USE OF INSULIN (HCC): Primary | ICD-10-CM

## 2024-04-05 NOTE — TELEPHONE ENCOUNTER
----- Message from Ned Ram sent at 4/4/2024  6:16 PM EDT -----  Regarding: Megan 3  Contact: [phone]  Our insurance doesn’t cover the liver 3, but our rep said they might cover the dexcom g6, but it will require a preauthorization and also maybe some clarification as to why I need it since I am type 2.    I think having this is helping me watch what I eat and keep my sugars a little more in control.

## 2024-04-06 DIAGNOSIS — E11.9 TYPE 2 DIABETES MELLITUS WITHOUT COMPLICATION, WITHOUT LONG-TERM CURRENT USE OF INSULIN (HCC): ICD-10-CM

## 2024-04-08 DIAGNOSIS — E11.9 TYPE 2 DIABETES MELLITUS WITHOUT COMPLICATION, WITHOUT LONG-TERM CURRENT USE OF INSULIN (HCC): ICD-10-CM

## 2024-04-08 RX ORDER — GLIMEPIRIDE 4 MG/1
TABLET ORAL
Qty: 180 TABLET | Refills: 1 | OUTPATIENT
Start: 2024-04-08

## 2024-04-08 RX ORDER — GLIMEPIRIDE 4 MG/1
TABLET ORAL
Qty: 180 TABLET | Refills: 1 | Status: SHIPPED | OUTPATIENT
Start: 2024-04-08

## 2024-04-08 RX ORDER — PROCHLORPERAZINE 25 MG/1
SUPPOSITORY RECTAL
Qty: 1 EACH | Refills: 3 | Status: SHIPPED | OUTPATIENT
Start: 2024-04-08

## 2024-04-08 RX ORDER — PROCHLORPERAZINE 25 MG/1
SUPPOSITORY RECTAL
Qty: 9 EACH | Refills: 3 | Status: SHIPPED | OUTPATIENT
Start: 2024-04-08

## 2024-07-02 ENCOUNTER — OFFICE VISIT (OUTPATIENT)
Dept: ENDOCRINOLOGY | Age: 54
End: 2024-07-02
Payer: COMMERCIAL

## 2024-07-02 VITALS — WEIGHT: 287 LBS | BODY MASS INDEX: 33.21 KG/M2 | HEIGHT: 78 IN

## 2024-07-02 DIAGNOSIS — E55.9 VITAMIN D DEFICIENCY: ICD-10-CM

## 2024-07-02 DIAGNOSIS — E78.5 DYSLIPIDEMIA ASSOCIATED WITH TYPE 2 DIABETES MELLITUS (HCC): ICD-10-CM

## 2024-07-02 DIAGNOSIS — E66.09 CLASS 1 OBESITY DUE TO EXCESS CALORIES WITHOUT SERIOUS COMORBIDITY WITH BODY MASS INDEX (BMI) OF 31.0 TO 31.9 IN ADULT: ICD-10-CM

## 2024-07-02 DIAGNOSIS — E11.69 DYSLIPIDEMIA ASSOCIATED WITH TYPE 2 DIABETES MELLITUS (HCC): ICD-10-CM

## 2024-07-02 DIAGNOSIS — E11.65 TYPE 2 DIABETES MELLITUS WITH HYPERGLYCEMIA, WITHOUT LONG-TERM CURRENT USE OF INSULIN (HCC): Primary | ICD-10-CM

## 2024-07-02 DIAGNOSIS — Z91.119 DIETARY NONCOMPLIANCE: ICD-10-CM

## 2024-07-02 LAB — HBA1C MFR BLD: 7.4 %

## 2024-07-02 PROCEDURE — 3051F HG A1C>EQUAL 7.0%<8.0%: CPT | Performed by: NURSE PRACTITIONER

## 2024-07-02 PROCEDURE — 95251 CONT GLUC MNTR ANALYSIS I&R: CPT | Performed by: NURSE PRACTITIONER

## 2024-07-02 PROCEDURE — 99214 OFFICE O/P EST MOD 30 MIN: CPT | Performed by: NURSE PRACTITIONER

## 2024-07-02 PROCEDURE — 83036 HEMOGLOBIN GLYCOSYLATED A1C: CPT | Performed by: NURSE PRACTITIONER

## 2024-07-02 RX ORDER — INSULIN ASPART 100 [IU]/ML
INJECTION, SOLUTION INTRAVENOUS; SUBCUTANEOUS
Qty: 3 ADJUSTABLE DOSE PRE-FILLED PEN SYRINGE | Refills: 3 | Status: SHIPPED | OUTPATIENT
Start: 2024-07-02

## 2024-07-02 NOTE — PATIENT INSTRUCTIONS
Novolog - > Take 5 units plus low SS at dinner   -200 add 1U  -250 add 2U  -300 add 3U  -350 add 4U  -400 add 5U  BS over 400 add 5U   Ok to use the scale  for high BS 2 hours after eating    Continue glimepiride 4 mg 1 tablet twice daily  Continue Lantus to pen 26 units at night   Jardiance 25 mg daily.

## 2024-07-02 NOTE — PROGRESS NOTES
insulin aspart (NOVOLOG FLEXPEN) 100 UNIT/ML injection pen      2. Vitamin D deficiency        3. Dietary noncompliance        4. Class 1 obesity due to excess calories without serious comorbidity with body mass index (BMI) of 31.0 to 31.9 in adult        5. Dyslipidemia associated with type 2 diabetes mellitus (HCC)                  Plan:     1. Type 2 diabetes mellitus without complication, without long-term current use of insulin (HCC)   Patient diabetes variable.  Hemoglobin A1c 8.2% -> 8.5%->8.7%-> 7.4%  Has made significant food changes  Will make changes as BS high at dinner  Novolog - > Take 5 units plus low SS at dinner   Ok to use the scale  for high BS 2 hours after eating  Lowest A1c since records dating back to 2021  Continue glimepiride 4 mg 1 tablet twice daily  Continue Lantus to pen 26 units at night   Jardiance 25 mg daily.    Check blood sugars BID   Hemoglobin A1c goal 6.5 to 7%  Encouraged diet and exercise     2. Vitamin D deficiency   On weekly replacement     3. Dietary noncompliance   Discussed with patient the importance of eating consistent carbohydrate meals, avoiding high glycemic index food. Also, discussed with patient the risk and negative consequences of dietary noncompliance on blood glucose control, blood pressure and weight  Patient will meet with dietitian  Improving      4. Class 1 obesity due to excess calories without serious comorbidity with body mass index (BMI) of 31 in adult   Discussed lifestyle changes including diet and exercise with patient in depth. Also discussed with patient cardiovascular risk associated with obesity     5.      Dyslipidemia  Continue atorvastatin 40 mg at night  Encourage diet and exercise        I personally spent > 30 minutes reviewing  external notes from PCP and other patient's care team providers, and personally interpreted labs associated with the above diagnosis. I also ordered labs to further assess and manage the above addressed

## 2024-07-11 ENCOUNTER — HOSPITAL ENCOUNTER (EMERGENCY)
Age: 54
Discharge: HOME | End: 2024-07-11
Payer: COMMERCIAL

## 2024-07-11 VITALS
OXYGEN SATURATION: 98 % | TEMPERATURE: 97.9 F | WEIGHT: 280 LBS | DIASTOLIC BLOOD PRESSURE: 77 MMHG | RESPIRATION RATE: 20 BRPM | SYSTOLIC BLOOD PRESSURE: 103 MMHG | HEART RATE: 91 BPM | BODY MASS INDEX: 30.76 KG/M2

## 2024-07-11 DIAGNOSIS — R10.84 GENERALIZED ABDOMINAL PAIN: Primary | ICD-10-CM

## 2024-07-11 PROCEDURE — 99211 OFF/OP EST MAY X REQ PHY/QHP: CPT

## 2024-07-11 NOTE — ED PROVIDER NOTES
[facility]  Department of Emergency Medicine     Encounter Note  Admit Date/RoomTime: 2024  5:33 PM   Room:     NAME: Ned Ram  : 1970  MRN: [id number]     Chief Complaint:  Abdominal Pain (Stomach ache that started last night. Denies N/V. )    History of Present Illness        Ned Ram is a 54 y.o. old male who has a past medical history of GERD, diabetes, hypertension, kidney stones, gastroparesis presents to the Urgent Care with complaints of a generalized and mid epigastric abdominal pain onset last evening, progressively worsened throughout the day today.  Has been eating and drinking with no problem, no fevers, no nausea, vomiting, diarrhea.  States his blood sugars have been running a little high in the 200s, after review of the patient's chart he does have a lot of dietary noncompliance and blood sugars are typically poorly controlled.  Denies any back or flank pain, moved his bowels normally this morning, is voiding difficulty.  ROS   Pertinent positives and negatives are stated within HPI, all other systems reviewed and are negative.    Past Medical History:  has a past medical history of Acid reflux, Diabetes mellitus (HCC), Hypertension, and Kidney stone.    Surgical History:  has a past surgical history that includes Lithotripsy.    Social History:  reports that he has never smoked. He has never used smokeless tobacco. He reports that he does not drink alcohol and does not use drugs.    Family History: family history is not on file.     Allergies: Patient has no known allergies.    Physical Exam   Oxygen Saturation Interpretation: Normal.        ED Triage Vitals   BP Temp Temp src Pulse Resp SpO2 Height Weight   -- -- -- -- -- -- -- --       Physical Exam  General Appearance/Constitutional:  Alert, development consistent with age.  HEENT:  NC/NT. PERRLA.  Airway patent.  Neck:  Supple.  No lymphadenopathy.  Respiratory: Lungs Clear to auscultation and breath

## 2024-07-11 NOTE — DISCHARGE INSTRUCTIONS
I recommend that you go to the emergency department, there are many things that could be wrong bleeding and diabetic ketoacidosis, bowel obstruction, and aortic aneurysm, this could be coming from your heart, I have no way of telling this in the urgent care, we do not have any advanced imaging.

## 2024-08-09 DIAGNOSIS — E11.9 TYPE 2 DIABETES MELLITUS WITHOUT COMPLICATION, WITHOUT LONG-TERM CURRENT USE OF INSULIN (HCC): ICD-10-CM

## 2024-08-12 RX ORDER — GLIMEPIRIDE 4 MG/1
TABLET ORAL
Qty: 180 TABLET | Refills: 1 | Status: SHIPPED | OUTPATIENT
Start: 2024-08-12

## 2024-11-20 ENCOUNTER — OFFICE VISIT (OUTPATIENT)
Dept: ENDOCRINOLOGY | Age: 54
End: 2024-11-20

## 2024-11-20 VITALS — BODY MASS INDEX: 33.09 KG/M2 | WEIGHT: 286 LBS | HEIGHT: 78 IN

## 2024-11-20 DIAGNOSIS — E66.811 CLASS 1 OBESITY DUE TO EXCESS CALORIES WITHOUT SERIOUS COMORBIDITY WITH BODY MASS INDEX (BMI) OF 33.0 TO 33.9 IN ADULT: ICD-10-CM

## 2024-11-20 DIAGNOSIS — E11.69 DYSLIPIDEMIA ASSOCIATED WITH TYPE 2 DIABETES MELLITUS (HCC): ICD-10-CM

## 2024-11-20 DIAGNOSIS — E11.9 TYPE 2 DIABETES MELLITUS WITHOUT COMPLICATION, WITHOUT LONG-TERM CURRENT USE OF INSULIN (HCC): ICD-10-CM

## 2024-11-20 DIAGNOSIS — E55.9 VITAMIN D DEFICIENCY: ICD-10-CM

## 2024-11-20 DIAGNOSIS — Z91.119 DIETARY NONCOMPLIANCE: ICD-10-CM

## 2024-11-20 DIAGNOSIS — E78.5 DYSLIPIDEMIA ASSOCIATED WITH TYPE 2 DIABETES MELLITUS (HCC): ICD-10-CM

## 2024-11-20 DIAGNOSIS — E66.09 CLASS 1 OBESITY DUE TO EXCESS CALORIES WITHOUT SERIOUS COMORBIDITY WITH BODY MASS INDEX (BMI) OF 33.0 TO 33.9 IN ADULT: ICD-10-CM

## 2024-11-20 DIAGNOSIS — E11.65 TYPE 2 DIABETES MELLITUS WITH HYPERGLYCEMIA, WITHOUT LONG-TERM CURRENT USE OF INSULIN (HCC): Primary | ICD-10-CM

## 2024-11-20 LAB — HBA1C MFR BLD: 6.6 %

## 2024-11-20 RX ORDER — INSULIN GLARGINE 100 [IU]/ML
26 INJECTION, SOLUTION SUBCUTANEOUS NIGHTLY
Qty: 30 ML | Refills: 3 | Status: SHIPPED | OUTPATIENT
Start: 2024-11-20

## 2024-11-20 RX ORDER — ATORVASTATIN CALCIUM 40 MG/1
40 TABLET, FILM COATED ORAL DAILY
Qty: 90 TABLET | Refills: 3 | Status: SHIPPED | OUTPATIENT
Start: 2024-11-20

## 2024-11-20 NOTE — PROGRESS NOTES
[employer]  [facility] Department of Endocrinology Diabetes and Metabolism   [address], [place] [address]  Phone: [phone]  Fax: [phone]    Date of Service: 11/20/2024    Primary Care Physician: Eduardo Smith MD  Referring physician: No ref. provider found  Provider: JASEN Ocampo NP     Reason for the visit:    Type 2 DM     History of Present Illness:  The history is provided by the patient. No  was used. Accuracy of the patient data is excellent.  Ned Ram is a very pleasant 54 y.o. male seen today for diabetes management     Ned Ram was diagnosed with diabetes in 10/2019 and  on Glimepiride 4 mg BID, Lantus 26 units at night, Jardiance 25 mg daily     Trulicity stopped d/t hx of gastroparesis   Patient attempted metformin in the past however this caused severe GI upset     The patient has been checking blood sugar via Dexcom G6  Avg BS  161   TIR  70%  Hyperglycemia 30%  Lows 0 %    BS at dinner time high     Most recent A1c results summarized below  Lab Results   Component Value Date/Time    LABA1C 6.6 11/20/2024 03:53 PM    LABA1C 7.4 07/02/2024 03:20 PM    LABA1C 8.7 03/25/2024 03:06 PM       Patient has had no hypoglycemic episodes  The patient hasn't been mindful of what has been eating and wasn't following diabetes diet   I reviewed current medications and the patient has no issues with diabetes medications  Ned Ram is up to date with eye exam and denied any history of diabetic retinopathy   The patient performs  own feet care  Microvascular complications:  No Retinopathy, Nephropathy or Neuropathy   Macrovascular complications: no CAD     The patient receives Flushot every year    No HX of pancreatitis  No Hx of MTC  + HX of gastroparesis         PAST MEDICAL HISTORY   Past Medical History:   Diagnosis Date    Acid reflux     Diabetes mellitus (HCC)     Hypertension     Kidney stone        PAST SURGICAL

## 2024-11-20 NOTE — PATIENT INSTRUCTIONS
Take 3-5-6 units plus low SS at all meals   Ok to use the scale  for high BS 2 hours after eating  Continue glimepiride 4 mg 1 tablet twice daily  Continue Lantus to pen 26 units at night   Jardiance 25 mg daily.

## 2024-11-21 ENCOUNTER — PATIENT MESSAGE (OUTPATIENT)
Dept: ENDOCRINOLOGY | Age: 54
End: 2024-11-21

## 2024-11-21 DIAGNOSIS — E11.9 TYPE 2 DIABETES MELLITUS WITHOUT COMPLICATION, WITHOUT LONG-TERM CURRENT USE OF INSULIN (HCC): ICD-10-CM

## 2024-12-02 DIAGNOSIS — E11.9 TYPE 2 DIABETES MELLITUS WITHOUT COMPLICATION, WITHOUT LONG-TERM CURRENT USE OF INSULIN (HCC): ICD-10-CM

## 2024-12-03 RX ORDER — PROCHLORPERAZINE 25 MG/1
SUPPOSITORY RECTAL
Qty: 1 EACH | Refills: 3 | Status: SHIPPED | OUTPATIENT
Start: 2024-12-03

## 2024-12-03 RX ORDER — PROCHLORPERAZINE 25 MG/1
SUPPOSITORY RECTAL
Qty: 9 EACH | Refills: 3 | Status: SHIPPED | OUTPATIENT
Start: 2024-12-03

## 2025-01-20 DIAGNOSIS — E11.9 TYPE 2 DIABETES MELLITUS WITHOUT COMPLICATION, WITHOUT LONG-TERM CURRENT USE OF INSULIN (HCC): ICD-10-CM

## 2025-01-20 RX ORDER — PROCHLORPERAZINE 25 MG/1
SUPPOSITORY RECTAL
Qty: 1 EACH | Refills: 3 | Status: SHIPPED | OUTPATIENT
Start: 2025-01-20

## 2025-01-20 RX ORDER — PROCHLORPERAZINE 25 MG/1
SUPPOSITORY RECTAL
Qty: 9 EACH | Refills: 3 | Status: SHIPPED | OUTPATIENT
Start: 2025-01-20

## 2025-03-20 ENCOUNTER — OFFICE VISIT (OUTPATIENT)
Dept: ENDOCRINOLOGY | Age: 55
End: 2025-03-20
Payer: COMMERCIAL

## 2025-03-20 VITALS
SYSTOLIC BLOOD PRESSURE: 124 MMHG | TEMPERATURE: 98.1 F | BODY MASS INDEX: 33.44 KG/M2 | DIASTOLIC BLOOD PRESSURE: 78 MMHG | WEIGHT: 289 LBS | HEIGHT: 78 IN

## 2025-03-20 DIAGNOSIS — E66.811 CLASS 1 OBESITY DUE TO EXCESS CALORIES WITHOUT SERIOUS COMORBIDITY WITH BODY MASS INDEX (BMI) OF 33.0 TO 33.9 IN ADULT: ICD-10-CM

## 2025-03-20 DIAGNOSIS — E78.5 DYSLIPIDEMIA ASSOCIATED WITH TYPE 2 DIABETES MELLITUS: ICD-10-CM

## 2025-03-20 DIAGNOSIS — E55.9 VITAMIN D DEFICIENCY: ICD-10-CM

## 2025-03-20 DIAGNOSIS — E66.09 CLASS 1 OBESITY DUE TO EXCESS CALORIES WITHOUT SERIOUS COMORBIDITY WITH BODY MASS INDEX (BMI) OF 33.0 TO 33.9 IN ADULT: ICD-10-CM

## 2025-03-20 DIAGNOSIS — Z91.119 DIETARY NONCOMPLIANCE: ICD-10-CM

## 2025-03-20 DIAGNOSIS — E11.65 TYPE 2 DIABETES MELLITUS WITH HYPERGLYCEMIA, WITHOUT LONG-TERM CURRENT USE OF INSULIN (HCC): Primary | ICD-10-CM

## 2025-03-20 DIAGNOSIS — E11.69 DYSLIPIDEMIA ASSOCIATED WITH TYPE 2 DIABETES MELLITUS: ICD-10-CM

## 2025-03-20 LAB — HBA1C MFR BLD: 7 %

## 2025-03-20 PROCEDURE — 95251 CONT GLUC MNTR ANALYSIS I&R: CPT | Performed by: NURSE PRACTITIONER

## 2025-03-20 PROCEDURE — 3051F HG A1C>EQUAL 7.0%<8.0%: CPT | Performed by: NURSE PRACTITIONER

## 2025-03-20 PROCEDURE — 99214 OFFICE O/P EST MOD 30 MIN: CPT | Performed by: NURSE PRACTITIONER

## 2025-03-20 PROCEDURE — 83036 HEMOGLOBIN GLYCOSYLATED A1C: CPT | Performed by: NURSE PRACTITIONER

## 2025-03-20 RX ORDER — INSULIN PMP CART,AUT,G6/7,CNTR
EACH SUBCUTANEOUS
Qty: 1 KIT | Refills: 0 | Status: SHIPPED | OUTPATIENT
Start: 2025-03-20

## 2025-03-20 RX ORDER — INSULIN PMP CART,AUT,G6/7,CNTR
EACH SUBCUTANEOUS
Qty: 30 EACH | Refills: 3 | Status: SHIPPED | OUTPATIENT
Start: 2025-03-20

## 2025-03-20 RX ORDER — INSULIN ASPART 100 [IU]/ML
INJECTION, SOLUTION INTRAVENOUS; SUBCUTANEOUS
Qty: 3 ADJUSTABLE DOSE PRE-FILLED PEN SYRINGE | Refills: 3
Start: 2025-03-20

## 2025-03-20 NOTE — PROGRESS NOTES
Date    Acid reflux     Diabetes mellitus (HCC)     Hypertension     Kidney stone        PAST SURGICAL HISTORY   Past Surgical History:   Procedure Laterality Date    LITHOTRIPSY         SOCIAL HISTORY   Tobacco:   reports that he has never smoked. He has never used smokeless tobacco.  Alcohol:   reports no history of alcohol use.  Drugs:   reports no history of drug use.    FAMILY HISTORY   No family history on file.    ALLERGIES AND DRUG REACTIONS   No Known Allergies    CURRENT MEDICATIONS   Current Outpatient Medications   Medication Sig Dispense Refill    Insulin Disposable Pump (OMNIPOD 5 [id number] INTRO GEN 5) KIT 1 kit 1 kit 0    Insulin Disposable Pump (OMNIPOD 5 [id number] PODS GEN 5) MISC Change PODs  every 3 days 30 each 3    empagliflozin (JARDIANCE) 25 MG tablet Take 1 tablet once a day 90 day supply 90 tablet 3    insulin glargine (LANTUS SOLOSTAR) 100 UNIT/ML injection pen Inject 26 Units into the skin nightly 30 mL 3    glimepiride (AMARYL) 4 MG tablet TAKE 1 TABLET BY MOUTH TWICE A  tablet 1    insulin aspart (NOVOLOG FLEXPEN) 100 UNIT/ML injection pen Inject 5 units plus -200 add 1U, -250 add 2U, -300 add 3U, -350 add 4U, -400 add 5U, BS over 400 add 5U at dinner and as needed  Max dose 30  units a day 3 Adjustable Dose Pre-filled Pen Syringe 3    Continuous Glucose Transmitter (DEXCOM G6 TRANSMITTER) MISC Change every 90 days 1 each 3    Continuous Glucose Sensor (DEXCOM G6 SENSOR) MISC Change sensor every 10 days 9 each 3    atorvastatin (LIPITOR) 40 MG tablet Take 1 tablet by mouth daily 90 tablet 3    ergocalciferol (DRISDOL) 1.25 MG (68601 UT) capsule Take 1 capsule by mouth once a week 12 capsule 3    blood glucose test strips (ACCU-CHEK GUIDE) strip As needed to check blood sugar 2x per day. 100 each 5    blood glucose test strips (ASCENSIA AUTODISC VI;ONE TOUCH ULTRA TEST VI) strip Accu-Chek Guide test strips use to test blood sugars 2x daily 100 each 6

## 2025-03-20 NOTE — PATIENT INSTRUCTIONS
Novolog - > Take 5-7-7 units plus low SS at all meals   Ok to use the scale  for high BS 2 hours after eating  Continue glimepiride 4 mg 1 tablet twice daily -> Breakfast and Dinner  Continue Lantus to pen 26 units at night   Jardiance 25 mg daily.

## 2025-03-24 ENCOUNTER — TELEPHONE (OUTPATIENT)
Dept: ENDOCRINOLOGY | Age: 55
End: 2025-03-24